# Patient Record
Sex: FEMALE | Race: WHITE | NOT HISPANIC OR LATINO | Employment: OTHER | URBAN - METROPOLITAN AREA
[De-identification: names, ages, dates, MRNs, and addresses within clinical notes are randomized per-mention and may not be internally consistent; named-entity substitution may affect disease eponyms.]

---

## 2020-10-01 PROBLEM — E55.9 VITAMIN D DEFICIENCY: Status: ACTIVE | Noted: 2020-10-01

## 2021-08-06 ENCOUNTER — OFFICE VISIT (OUTPATIENT)
Dept: INTERNAL MEDICINE | Facility: CLINIC | Age: 66
End: 2021-08-06

## 2021-08-06 VITALS — HEIGHT: 63 IN | BODY MASS INDEX: 27.29 KG/M2 | TEMPERATURE: 97.8 F | WEIGHT: 154 LBS

## 2021-08-06 DIAGNOSIS — Z00.00 HEALTHCARE MAINTENANCE: ICD-10-CM

## 2021-08-06 DIAGNOSIS — Z12.31 SCREENING MAMMOGRAM, ENCOUNTER FOR: ICD-10-CM

## 2021-08-06 DIAGNOSIS — L40.9 PSORIASIS: ICD-10-CM

## 2021-08-06 DIAGNOSIS — M89.9 DISORDER OF BONE, UNSPECIFIED: ICD-10-CM

## 2021-08-06 DIAGNOSIS — Z11.59 NEED FOR HEPATITIS C SCREENING TEST: ICD-10-CM

## 2021-08-06 DIAGNOSIS — Z12.11 ENCOUNTER FOR SCREENING COLONOSCOPY: Primary | ICD-10-CM

## 2021-08-06 DIAGNOSIS — Z78.0 POSTMENOPAUSAL: ICD-10-CM

## 2021-08-06 PROCEDURE — G0439 PPPS, SUBSEQ VISIT: HCPCS | Performed by: PHYSICIAN ASSISTANT

## 2021-08-06 RX ORDER — CLOBETASOL PROPIONATE 0.46 MG/ML
SOLUTION TOPICAL DAILY
Qty: 50 ML | Refills: 2 | Status: SHIPPED | OUTPATIENT
Start: 2021-08-06

## 2021-08-06 RX ORDER — TRIAMCINOLONE ACETONIDE 0.25 MG/G
OINTMENT TOPICAL 2 TIMES DAILY
Qty: 80 G | Refills: 1 | Status: SHIPPED | OUTPATIENT
Start: 2021-08-06

## 2021-08-06 RX ORDER — MULTIPLE VITAMINS W/ MINERALS TAB 9MG-400MCG
1 TAB ORAL DAILY
COMMUNITY
End: 2021-12-06

## 2021-08-06 NOTE — PROGRESS NOTES
The ABCs of the Annual Wellness Visit  Subsequent Medicare Wellness Visit    Chief Complaint   Patient presents with   • Establish Care   • Breast Problem     tingling       Subjective   History of Present Illness:  Trinh Go is a 65 y.o. female who presents for a Subsequent Medicare Wellness Visit and to establish care as a new patient. She has not seen a PCP in 5 years. She has never had a screening colonoscopy. Has no constipation, diarrhea or rectal bleeding. She denies unintentional weight loss. She has not had a mammogram since , needs order today as well as DEXA. Her last Pap was in  and was negative, has never had an abnormal.     Tingling of her breast bilaterally for the last few months. Happens a few times a week. No lumps or masses palpated. Feels like when milk wants to let down during pregnancy. 6 months or so. No rashes and no nipple discharge.     HEALTH RISK ASSESSMENT    Recent Hospitalizations:  No hospitalization(s) within the last year.    Current Medical Providers:  Patient Care Team:  Rebecca Corbin PA-C as PCP - General (Physician Assistant)    Smoking Status:  Social History     Tobacco Use   Smoking Status Former Smoker   • Packs/day: 1.00   • Types: Cigarettes   • Quit date:    • Years since quittin.6   Smokeless Tobacco Never Used       Alcohol Consumption:  Social History     Substance and Sexual Activity   Alcohol Use Yes   • Alcohol/week: 7.0 standard drinks   • Types: 7 Glasses of wine per week       Depression Screen:   PHQ-2/PHQ-9 Depression Screening 2021   Little interest or pleasure in doing things 0   Feeling down, depressed, or hopeless 0   Trouble falling or staying asleep, or sleeping too much 0   Feeling tired or having little energy 0   Poor appetite or overeating 0   Feeling bad about yourself - or that you are a failure or have let yourself or your family down 0   Trouble concentrating on things, such as reading the newspaper or watching television  0   Moving or speaking so slowly that other people could have noticed. Or the opposite - being so fidgety or restless that you have been moving around a lot more than usual 0   Thoughts that you would be better off dead, or of hurting yourself in some way 0   Total Score 0       Fall Risk Screen:  VJ Fall Risk Assessment was completed, and patient is at LOW risk for falls.Assessment completed on:8/6/2021    Health Habits and Functional and Cognitive Screening:  Functional & Cognitive Status 8/6/2021   Do you have difficulty preparing food and eating? No   Do you have difficulty bathing yourself, getting dressed or grooming yourself? No   Do you have difficulty using the toilet? No   Do you have difficulty moving around from place to place? No   Do you have trouble with steps or getting out of a bed or a chair? No   Current Diet Well Balanced Diet   Dental Exam Up to date   Eye Exam Up to date   Exercise (times per week) 2 times per week   Current Exercises Include Walking   Do you need help using the phone?  No   Are you deaf or do you have serious difficulty hearing?  No   Do you need help with transportation? No   Do you need help shopping? No   Do you need help preparing meals?  No   Do you need help with housework?  No   Do you need help with laundry? No   Do you need help taking your medications? No   Do you need help managing money? No   Do you ever drive or ride in a car without wearing a seat belt? No   Have you felt unusual stress, anger or loneliness in the last month? No   Who do you live with? Spouse   If you need help, do you have trouble finding someone available to you? No   Have you been bothered in the last four weeks by sexual problems? No   Do you have difficulty concentrating, remembering or making decisions? No         Does the patient have evidence of cognitive impairment? No    Asprin use counseling:Does not need ASA (and currently is not on it)    Age-appropriate Screening  Schedule:  Refer to the list below for future screening recommendations based on patient's age, sex and/or medical conditions. Orders for these recommended tests are listed in the plan section. The patient has been provided with a written plan.    Health Maintenance   Topic Date Due   • TDAP/TD VACCINES (1 - Tdap) Never done   • ZOSTER VACCINE (1 of 2) Never done   • MAMMOGRAM  04/03/2017   • DXA SCAN  04/03/2017   • PAP SMEAR  03/26/2018   • INFLUENZA VACCINE  10/01/2021          The following portions of the patient's history were reviewed and updated as appropriate: allergies, current medications, past family history, past medical history, past social history, past surgical history and problem list.    Outpatient Medications Prior to Visit   Medication Sig Dispense Refill   • multivitamin with minerals tablet tablet Take 1 tablet by mouth Daily.     • clotrimazole-betamethasone (LOTRISONE) 1-0.05 % cream Apply  topically 2 (two) times a day. 1 each 0   • naproxen sodium (ALEVE) 220 MG tablet Take  by mouth.       Facility-Administered Medications Prior to Visit   Medication Dose Route Frequency Provider Last Rate Last Admin   • mupirocin (BACTROBAN) 2 % ointment   Topical Q12H Winnie Yun MD           Patient Active Problem List   Diagnosis   • Arthritis of knee   • Effusion of knee   • Current tear knee, medial meniscus   • Vitamin D deficiency   • Psoriasis       Advanced Care Planning:  ACP discussion was held with the patient during this visit. Patient does not have an advance directive, information provided.    Review of Systems    Compared to one year ago, the patient feels her physical health is the same.  Compared to one year ago, the patient feels her mental health is the same.    Reviewed chart for potential of high risk medication in the elderly: yes  Reviewed chart for potential of harmful drug interactions in the elderly:yes    Objective         Vitals:    08/06/21 0916   Temp: 97.8 °F  "(36.6 °C)   Weight: 69.9 kg (154 lb)   Height: 160 cm (63\")       Body mass index is 27.28 kg/m².  Discussed the patient's BMI with her. The BMI is in the acceptable range.    Physical Exam  Vitals reviewed.   Constitutional:       Appearance: She is well-developed.   HENT:      Head: Normocephalic and atraumatic.      Mouth/Throat:      Mouth: Mucous membranes are moist.      Pharynx: Oropharynx is clear.   Eyes:      Extraocular Movements: Extraocular movements intact.      Conjunctiva/sclera: Conjunctivae normal.      Pupils: Pupils are equal, round, and reactive to light.   Neck:      Thyroid: No thyromegaly.      Vascular: No carotid bruit.   Cardiovascular:      Rate and Rhythm: Normal rate and regular rhythm.      Heart sounds: Normal heart sounds. No murmur heard.     Pulmonary:      Effort: Pulmonary effort is normal.      Breath sounds: Normal breath sounds.   Abdominal:      General: There is no distension.      Palpations: Abdomen is soft. There is no mass.      Tenderness: There is no abdominal tenderness. There is no rebound.   Musculoskeletal:      Cervical back: Neck supple.   Lymphadenopathy:      Cervical: No cervical adenopathy.   Skin:     General: Skin is warm.   Neurological:      Mental Status: She is alert.   Psychiatric:         Behavior: Behavior normal.             Assessment/Plan   Medicare Risks and Personalized Health Plan  CMS Preventative Services Quick Reference  Advance Directive Discussion  Breast Cancer/Mammogram Screening  Colon Cancer Screening  Immunizations Discussed/Encouraged (specific immunizations; Pneumococcal 23 )    The above risks/problems have been discussed with the patient.  Pertinent information has been shared with the patient in the After Visit Summary.  Follow up plans and orders are seen below in the Assessment/Plan Section.    Diagnoses and all orders for this visit:    1. Encounter for screening colonoscopy (Primary)  -     Ambulatory Referral to " Gastroenterology    2. Screening mammogram, encounter for  -     Mammo Screening Digital Tomosynthesis Bilateral With CAD; Future    3. Need for hepatitis C screening test  -     Hepatitis C Antibody    4. Healthcare maintenance  -     Comprehensive Metabolic Panel  -     Lipid Panel With / Chol / HDL Ratio  -     Urinalysis With Culture If Indicated -    5. Postmenopausal  -     Vitamin D 25 Hydroxy  -     DEXA Bone Density Axial    6. Disorder of bone, unspecified   -     Vitamin D 25 Hydroxy    7. Psoriasis  -     clobetasol (TEMOVATE) 0.05 % external solution; Apply  topically to the appropriate area as directed Daily.  Dispense: 50 mL; Refill: 2  -     triamcinolone (KENALOG) 0.025 % ointment; Apply  topically to the appropriate area as directed 2 (Two) Times a Day.  Dispense: 80 g; Refill: 1      Will set up mammogram, no abnormalities on exam. Will order DEXA scan. Fasting labs today. Referral for cscope. Will send in Clobetasol and Triamcinolone for her psoriasis.     Follow Up:  Return in about 1 year (around 8/6/2022) for Medicare Wellness, Lab Today, Lab Appt Before FUP.     An After Visit Summary and PPPS were given to the patient.

## 2021-08-06 NOTE — PROGRESS NOTES
Subjective   Chief Complaint   Patient presents with   • Establish Care   • Breast Problem     tingling       History of Present Illness      Patient Active Problem List   Diagnosis   • Arthritis of knee   • Effusion of knee   • Current tear knee, medial meniscus   • Vitamin D deficiency       No Known Allergies    Current Outpatient Medications on File Prior to Visit   Medication Sig Dispense Refill   • multivitamin with minerals tablet tablet Take 1 tablet by mouth Daily.     • [DISCONTINUED] clotrimazole-betamethasone (LOTRISONE) 1-0.05 % cream Apply  topically 2 (two) times a day. 1 each 0   • [DISCONTINUED] naproxen sodium (ALEVE) 220 MG tablet Take  by mouth.       Current Facility-Administered Medications on File Prior to Visit   Medication Dose Route Frequency Provider Last Rate Last Admin   • [DISCONTINUED] mupirocin (BACTROBAN) 2 % ointment   Topical Q12H Winnie Yun MD           History reviewed. No pertinent past medical history.    Family History   Problem Relation Age of Onset   • Hypertension Mother    • Hyperlipidemia Mother    • Cancer Father    • Diabetes Father    • Stroke Brother        Social History     Socioeconomic History   • Marital status:      Spouse name: Not on file   • Number of children: Not on file   • Years of education: Not on file   • Highest education level: Not on file   Tobacco Use   • Smoking status: Former Smoker     Packs/day: 1.00     Types: Cigarettes     Quit date:      Years since quittin.6   • Smokeless tobacco: Never Used   Vaping Use   • Vaping Use: Never used   Substance and Sexual Activity   • Alcohol use: Yes     Alcohol/week: 7.0 standard drinks     Types: 7 Glasses of wine per week   • Drug use: Never   • Sexual activity: Defer       Past Surgical History:   Procedure Laterality Date   • KNEE SURGERY           The following portions of the patient's history were reviewed and updated as appropriate: problem list, allergies, current  "medications, past medical history, past family history, past social history and past surgical history.    ROS      There is no immunization history on file for this patient.    Objective   Vitals:    08/06/21 0916   Temp: 97.8 °F (36.6 °C)   Weight: 69.9 kg (154 lb)   Height: 160 cm (63\")     Body mass index is 27.28 kg/m².  Physical Exam      Assessment/Plan   There are no diagnoses linked to this encounter.    Return in about 6 months (around 2/6/2022) for Medicare Wellness.           "

## 2021-08-07 LAB
25(OH)D3+25(OH)D2 SERPL-MCNC: 36 NG/ML (ref 30–100)
ALBUMIN SERPL-MCNC: 4.7 G/DL (ref 3.5–5.2)
ALBUMIN/GLOB SERPL: 1.7 G/DL
ALP SERPL-CCNC: 71 U/L (ref 39–117)
ALT SERPL-CCNC: 16 U/L (ref 1–33)
APPEARANCE UR: CLEAR
AST SERPL-CCNC: 21 U/L (ref 1–32)
BACTERIA #/AREA URNS HPF: NORMAL /HPF
BILIRUB SERPL-MCNC: 0.5 MG/DL (ref 0–1.2)
BILIRUB UR QL STRIP: NEGATIVE
BUN SERPL-MCNC: 12 MG/DL (ref 8–23)
BUN/CREAT SERPL: 16.2 (ref 7–25)
CALCIUM SERPL-MCNC: 9.9 MG/DL (ref 8.6–10.5)
CASTS URNS QL MICRO: NORMAL /LPF
CHLORIDE SERPL-SCNC: 104 MMOL/L (ref 98–107)
CHOLEST SERPL-MCNC: 188 MG/DL (ref 0–200)
CHOLEST/HDLC SERPL: 3.92 {RATIO}
CO2 SERPL-SCNC: 26.1 MMOL/L (ref 22–29)
COLOR UR: YELLOW
CREAT SERPL-MCNC: 0.74 MG/DL (ref 0.57–1)
EPI CELLS #/AREA URNS HPF: NORMAL /HPF (ref 0–10)
GLOBULIN SER CALC-MCNC: 2.7 GM/DL
GLUCOSE SERPL-MCNC: 89 MG/DL (ref 65–99)
GLUCOSE UR QL: NEGATIVE
HCV AB S/CO SERPL IA: <0.1 S/CO RATIO (ref 0–0.9)
HDLC SERPL-MCNC: 48 MG/DL (ref 40–60)
HGB UR QL STRIP: NEGATIVE
KETONES UR QL STRIP: NEGATIVE
LDLC SERPL CALC-MCNC: 120 MG/DL (ref 0–100)
LEUKOCYTE ESTERASE UR QL STRIP: NEGATIVE
MICRO URNS: NORMAL
MICRO URNS: NORMAL
NITRITE UR QL STRIP: NEGATIVE
PH UR STRIP: 7 [PH] (ref 5–7.5)
POTASSIUM SERPL-SCNC: 4.5 MMOL/L (ref 3.5–5.2)
PROT SERPL-MCNC: 7.4 G/DL (ref 6–8.5)
PROT UR QL STRIP: NORMAL
RBC #/AREA URNS HPF: NORMAL /HPF (ref 0–2)
SODIUM SERPL-SCNC: 142 MMOL/L (ref 136–145)
SP GR UR: 1.02 (ref 1–1.03)
TRIGL SERPL-MCNC: 109 MG/DL (ref 0–150)
URINALYSIS REFLEX: NORMAL
UROBILINOGEN UR STRIP-MCNC: 0.2 MG/DL (ref 0.2–1)
VLDLC SERPL CALC-MCNC: 20 MG/DL (ref 5–40)
WBC #/AREA URNS HPF: NORMAL /HPF (ref 0–5)

## 2021-08-10 ENCOUNTER — HOSPITAL ENCOUNTER (OUTPATIENT)
Dept: MAMMOGRAPHY | Facility: HOSPITAL | Age: 66
Discharge: HOME | End: 2021-08-10
Admitting: PHYSICIAN ASSISTANT

## 2021-08-10 DIAGNOSIS — Z12.31 SCREENING MAMMOGRAM, ENCOUNTER FOR: ICD-10-CM

## 2021-08-10 PROCEDURE — 77067 SCR MAMMO BI INCL CAD: CPT

## 2021-08-10 PROCEDURE — 77063 BREAST TOMOSYNTHESIS BI: CPT

## 2021-08-25 ENCOUNTER — OFFICE VISIT (OUTPATIENT)
Dept: INTERNAL MEDICINE | Facility: CLINIC | Age: 66
End: 2021-08-25

## 2021-08-25 VITALS — TEMPERATURE: 96.4 F | BODY MASS INDEX: 27.29 KG/M2 | HEIGHT: 63 IN | WEIGHT: 154 LBS

## 2021-08-25 DIAGNOSIS — Z01.419 ENCOUNTER FOR CERVICAL PAP SMEAR WITH PELVIC EXAM: Primary | ICD-10-CM

## 2021-08-25 PROCEDURE — G0101 CA SCREEN;PELVIC/BREAST EXAM: HCPCS | Performed by: PHYSICIAN ASSISTANT

## 2021-08-25 NOTE — PROGRESS NOTES
Subjective   Chief Complaint   Patient presents with   • Gynecologic Exam       History of Present Illness     Pt is here today for pap/pelvic. No pelvic pain or vaginal bleeding. Has been several years since her last exam.      Patient Active Problem List   Diagnosis   • Arthritis of knee   • Effusion of knee   • Current tear knee, medial meniscus   • Vitamin D deficiency   • Psoriasis       No Known Allergies    Current Outpatient Medications on File Prior to Visit   Medication Sig Dispense Refill   • clobetasol (TEMOVATE) 0.05 % external solution Apply  topically to the appropriate area as directed Daily. 50 mL 2   • multivitamin with minerals tablet tablet Take 1 tablet by mouth Daily.     • triamcinolone (KENALOG) 0.025 % ointment Apply  topically to the appropriate area as directed 2 (Two) Times a Day. 80 g 1     No current facility-administered medications on file prior to visit.       No past medical history on file.    Family History   Problem Relation Age of Onset   • Hypertension Mother    • Hyperlipidemia Mother    • Cancer Father         leukemia   • Diabetes Father    • Stroke Brother    • Colon cancer Maternal Grandmother    • Breast cancer Paternal Grandmother         70s       Social History     Socioeconomic History   • Marital status:      Spouse name: Not on file   • Number of children: Not on file   • Years of education: Not on file   • Highest education level: Not on file   Tobacco Use   • Smoking status: Former Smoker     Packs/day: 1.00     Types: Cigarettes     Quit date:      Years since quittin.6   • Smokeless tobacco: Never Used   Vaping Use   • Vaping Use: Never used   Substance and Sexual Activity   • Alcohol use: Yes     Alcohol/week: 7.0 standard drinks     Types: 7 Glasses of wine per week   • Drug use: Never   • Sexual activity: Defer       Past Surgical History:   Procedure Laterality Date   • KNEE SURGERY       The following portions of the patient's history were  "reviewed and updated as appropriate: problem list, allergies, current medications, past medical history, past family history, past social history and past surgical history.    Review of Systems   Genitourinary: Negative for pelvic pain.        No vaginal bleeding       Immunization History   Administered Date(s) Administered   • COVID-19 (PFIZER) 03/08/2021, 03/29/2021   • Flu Vaccine Quad PF >36MO 12/23/2019       Objective   Vitals:    08/25/21 1123   Temp: 96.4 °F (35.8 °C)   Weight: 69.9 kg (154 lb)   Height: 160 cm (63\")     Body mass index is 27.28 kg/m².  Physical Exam  Exam conducted with a chaperone present.   Constitutional:       Appearance: Normal appearance.   Genitourinary:     Labia:         Right: No lesion or injury.         Left: No lesion.       Vagina: Normal.      Cervix: Normal.      Uterus: Normal.       Adnexa: Right adnexa normal and left adnexa normal.   Neurological:      Mental Status: She is alert.       Assessment/Plan   Diagnoses and all orders for this visit:    1. Encounter for cervical Pap smear with pelvic exam (Primary)    Normal exam today, send out pap. FUP in 1 yr for CPE.            "

## 2021-08-27 LAB
CONV .: NORMAL
CYTOLOGIST CVX/VAG CYTO: NORMAL
CYTOLOGY CVX/VAG DOC CYTO: NORMAL
CYTOLOGY CVX/VAG DOC THIN PREP: NORMAL
DX ICD CODE: NORMAL
HIV 1 & 2 AB SER-IMP: NORMAL
OTHER STN SPEC: NORMAL
STAT OF ADQ CVX/VAG CYTO-IMP: NORMAL

## 2021-09-01 ENCOUNTER — PREP FOR SURGERY (OUTPATIENT)
Dept: SURGERY | Facility: SURGERY CENTER | Age: 66
End: 2021-09-01

## 2021-09-01 DIAGNOSIS — Z12.11 ENCOUNTER FOR SCREENING FOR MALIGNANT NEOPLASM OF COLON: Primary | ICD-10-CM

## 2021-09-01 DIAGNOSIS — Z80.0 FAMILY HISTORY OF COLON CANCER: ICD-10-CM

## 2021-09-01 RX ORDER — SODIUM CHLORIDE, SODIUM LACTATE, POTASSIUM CHLORIDE, CALCIUM CHLORIDE 600; 310; 30; 20 MG/100ML; MG/100ML; MG/100ML; MG/100ML
30 INJECTION, SOLUTION INTRAVENOUS CONTINUOUS PRN
Status: CANCELLED | OUTPATIENT
Start: 2021-09-01

## 2021-09-01 RX ORDER — SODIUM CHLORIDE 0.9 % (FLUSH) 0.9 %
10 SYRINGE (ML) INJECTION AS NEEDED
Status: CANCELLED | OUTPATIENT
Start: 2021-09-01

## 2021-09-01 RX ORDER — SODIUM CHLORIDE 0.9 % (FLUSH) 0.9 %
3 SYRINGE (ML) INJECTION EVERY 12 HOURS SCHEDULED
Status: CANCELLED | OUTPATIENT
Start: 2021-09-01

## 2021-11-10 ENCOUNTER — HOSPITAL ENCOUNTER (OUTPATIENT)
Dept: BONE DENSITY | Facility: HOSPITAL | Age: 66
Discharge: HOME | End: 2021-11-10
Admitting: PHYSICIAN ASSISTANT

## 2021-11-10 PROCEDURE — 77080 DXA BONE DENSITY AXIAL: CPT

## 2021-11-11 PROBLEM — M85.80 OSTEOPENIA: Status: ACTIVE | Noted: 2021-11-11

## 2021-12-06 RX ORDER — CALCIPOTRIENE AND BETAMETHASONE DIPROPIONATE 50; .5 UG/G; MG/G
1 AEROSOL, FOAM TOPICAL DAILY PRN
COMMUNITY

## 2021-12-06 NOTE — SIGNIFICANT NOTE
Education provided the Patient on the following:    - Nothing to Eat or Drink after MN the night before the procedure    - Avoid red/purple fluids while completing their bowel prep as ordered by physician  -Contact Gastrointerologist office for any questions about specific details regarding colon prep    -You will need to have someone drive you home after your colonoscopy and remain with you for 24 hours after the procedure  - The date of your Surgery, you may have one visitor at bedside or within 10-15 minutes of [facility] [place]  -Please wear warm socks when you arrive for your colonoscopy  -Remove all jewelry and leave any valuables before arriving the day of your procedure (all will have to be removed before leaving preop)  -You will need to arrive at 8:30 on 12/09 for your colonoscopy    -Feel free to contact us at: [phone] with any additional questions/concerns

## 2021-12-09 ENCOUNTER — ANESTHESIA EVENT (OUTPATIENT)
Dept: SURGERY | Facility: SURGERY CENTER | Age: 66
End: 2021-12-09

## 2021-12-09 ENCOUNTER — HOSPITAL ENCOUNTER (OUTPATIENT)
Facility: SURGERY CENTER | Age: 66
Setting detail: HOSPITAL OUTPATIENT SURGERY
Discharge: HOME | End: 2021-12-09
Attending: INTERNAL MEDICINE | Admitting: INTERNAL MEDICINE

## 2021-12-09 ENCOUNTER — ANESTHESIA (OUTPATIENT)
Dept: SURGERY | Facility: SURGERY CENTER | Age: 66
End: 2021-12-09

## 2021-12-09 VITALS
DIASTOLIC BLOOD PRESSURE: 73 MMHG | RESPIRATION RATE: 16 BRPM | HEIGHT: 63 IN | WEIGHT: 154 LBS | SYSTOLIC BLOOD PRESSURE: 109 MMHG | TEMPERATURE: 97.7 F | BODY MASS INDEX: 27.29 KG/M2 | HEART RATE: 79 BPM | OXYGEN SATURATION: 97 %

## 2021-12-09 DIAGNOSIS — Z12.11 ENCOUNTER FOR SCREENING FOR MALIGNANT NEOPLASM OF COLON: ICD-10-CM

## 2021-12-09 DIAGNOSIS — Z80.0 FAMILY HISTORY OF COLON CANCER: ICD-10-CM

## 2021-12-09 PROCEDURE — 0DBK8ZZ EXCISION OF ASCENDING COLON, VIA NATURAL OR ARTIFICIAL OPENING ENDOSCOPIC: ICD-10-PCS | Performed by: INTERNAL MEDICINE

## 2021-12-09 PROCEDURE — 25010000002 PROPOFOL 10 MG/ML EMULSION: Performed by: ANESTHESIOLOGY

## 2021-12-09 PROCEDURE — 45385 COLONOSCOPY W/LESION REMOVAL: CPT | Performed by: INTERNAL MEDICINE

## 2021-12-09 PROCEDURE — 88305 TISSUE EXAM BY PATHOLOGIST: CPT | Performed by: INTERNAL MEDICINE

## 2021-12-09 RX ORDER — ONDANSETRON 2 MG/ML
4 INJECTION INTRAMUSCULAR; INTRAVENOUS ONCE AS NEEDED
Status: DISCONTINUED | OUTPATIENT
Start: 2021-12-09 | End: 2021-12-09 | Stop reason: HOSPADM

## 2021-12-09 RX ORDER — SODIUM CHLORIDE, SODIUM LACTATE, POTASSIUM CHLORIDE, CALCIUM CHLORIDE 600; 310; 30; 20 MG/100ML; MG/100ML; MG/100ML; MG/100ML
30 INJECTION, SOLUTION INTRAVENOUS CONTINUOUS PRN
Status: DISCONTINUED | OUTPATIENT
Start: 2021-12-09 | End: 2021-12-09 | Stop reason: HOSPADM

## 2021-12-09 RX ORDER — MAGNESIUM HYDROXIDE 1200 MG/15ML
LIQUID ORAL AS NEEDED
Status: DISCONTINUED | OUTPATIENT
Start: 2021-12-09 | End: 2021-12-09 | Stop reason: HOSPADM

## 2021-12-09 RX ORDER — PROPOFOL 10 MG/ML
VIAL (ML) INTRAVENOUS AS NEEDED
Status: DISCONTINUED | OUTPATIENT
Start: 2021-12-09 | End: 2021-12-09 | Stop reason: SURG

## 2021-12-09 RX ORDER — PROPOFOL 10 MG/ML
VIAL (ML) INTRAVENOUS CONTINUOUS PRN
Status: DISCONTINUED | OUTPATIENT
Start: 2021-12-09 | End: 2021-12-09 | Stop reason: SURG

## 2021-12-09 RX ORDER — SODIUM CHLORIDE 0.9 % (FLUSH) 0.9 %
3 SYRINGE (ML) INJECTION EVERY 12 HOURS SCHEDULED
Status: DISCONTINUED | OUTPATIENT
Start: 2021-12-09 | End: 2021-12-09 | Stop reason: HOSPADM

## 2021-12-09 RX ORDER — SODIUM CHLORIDE 0.9 % (FLUSH) 0.9 %
10 SYRINGE (ML) INJECTION AS NEEDED
Status: DISCONTINUED | OUTPATIENT
Start: 2021-12-09 | End: 2021-12-09 | Stop reason: HOSPADM

## 2021-12-09 RX ORDER — LIDOCAINE HYDROCHLORIDE 20 MG/ML
INJECTION, SOLUTION INFILTRATION; PERINEURAL AS NEEDED
Status: DISCONTINUED | OUTPATIENT
Start: 2021-12-09 | End: 2021-12-09 | Stop reason: SURG

## 2021-12-09 RX ORDER — EPHEDRINE SULFATE 50 MG/ML
INJECTION INTRAVENOUS AS NEEDED
Status: DISCONTINUED | OUTPATIENT
Start: 2021-12-09 | End: 2021-12-09 | Stop reason: SURG

## 2021-12-09 RX ADMIN — SODIUM CHLORIDE, POTASSIUM CHLORIDE, SODIUM LACTATE AND CALCIUM CHLORIDE 30 ML/HR: 600; 310; 30; 20 INJECTION, SOLUTION INTRAVENOUS at 09:04

## 2021-12-09 RX ADMIN — Medication 180 MCG/KG/MIN: at 09:59

## 2021-12-09 RX ADMIN — PROPOFOL 100 MG: 10 INJECTION, EMULSION INTRAVENOUS at 09:59

## 2021-12-09 RX ADMIN — LIDOCAINE HYDROCHLORIDE 60 MG: 20 INJECTION, SOLUTION INFILTRATION; PERINEURAL at 09:59

## 2021-12-09 RX ADMIN — EPHEDRINE SULFATE 10 MG: 50 INJECTION, SOLUTION INTRAVENOUS at 10:17

## 2021-12-09 NOTE — H&P
No chief complaint on file.      HPI  Patient today for screening colonoscopy.         Problem List:    Patient Active Problem List   Diagnosis   • Arthritis of knee   • Effusion of knee   • Current tear knee, medial meniscus   • Vitamin D deficiency   • Psoriasis   • Encounter for screening for malignant neoplasm of colon   • Family history of colon cancer   • Osteopenia       Medical History:  History reviewed. No pertinent past medical history.     Social History:    Social History     Socioeconomic History   • Marital status:    Tobacco Use   • Smoking status: Former Smoker     Packs/day: 1.00     Types: Cigarettes     Quit date:      Years since quittin.9   • Smokeless tobacco: Never Used   Vaping Use   • Vaping Use: Never used   Substance and Sexual Activity   • Alcohol use: Yes     Alcohol/week: 7.0 standard drinks     Types: 7 Glasses of wine per week   • Drug use: Never   • Sexual activity: Defer       Family History:   Family History   Problem Relation Age of Onset   • Hypertension Mother    • Hyperlipidemia Mother    • Cancer Father         leukemia   • Diabetes Father    • Stroke Brother    • Colon cancer Maternal Grandmother    • Breast cancer Paternal Grandmother         70s       Surgical History:   Past Surgical History:   Procedure Laterality Date   • FRACTURE SURGERY     • FRACTURE SURGERY Right     arm   • KNEE SURGERY     • SHOULDER MANIPULATION Right          Current Facility-Administered Medications:   •  lactated ringers infusion, 30 mL/hr, Intravenous, Continuous PRN, Xavi Briceño MD  •  sodium chloride 0.9 % flush 10 mL, 10 mL, Intravenous, PRN, Xavi Briceño MD  •  sodium chloride 0.9 % flush 3 mL, 3 mL, Intravenous, Q12H, Xavi Briceño MD    Allergies: No Known Allergies     The following portions of the patient's history were reviewed by me and updated as appropriate: review of systems, allergies, current medications, past family history, past medical  history, past social history, past surgical history and problem list.    There were no vitals filed for this visit.    PHYSICAL EXAM:    CONSTITUTIONAL:  today's vital signs reviewed by me  GASTROINTESTINAL: abdomen is soft nontender nondistended with normal active bowel sounds, no masses are appreciated    Assessment/ Plan  We will proceed today with screening colonoscopy.    Risks and benefits as well as alternatives to endoscopic evaluation were explained to the patient and they voiced understanding and wish to proceed.  These risks include but are not limited to the risk of bleeding, perforation, adverse reaction to sedation, and missed lesions.  The patient was given the opportunity to ask questions prior to the endoscopic procedure.

## 2021-12-09 NOTE — ANESTHESIA PREPROCEDURE EVALUATION
Anesthesia Evaluation     Patient summary reviewed and Nursing notes reviewed   NPO Solid Status: > 8 hours  NPO Liquid Status: > 2 hours           Airway   Mallampati: II  Dental - normal exam   (+) partials    Pulmonary - negative pulmonary ROS and normal exam   Cardiovascular - negative cardio ROS and normal exam        Neuro/Psych- negative ROS  GI/Hepatic/Renal/Endo - negative ROS     Musculoskeletal     Abdominal    Substance History - negative use     OB/GYN          Other   arthritis,                    Anesthesia Plan    ASA 2     MAC       Anesthetic plan, all risks, benefits, and alternatives have been provided, discussed and informed consent has been obtained with: patient.

## 2021-12-09 NOTE — ANESTHESIA POSTPROCEDURE EVALUATION
"Patient: Trinh Go    Procedure Summary     Date: 12/09/21 Room / Location: [facility] / [facility]    Anesthesia Start: 0957 Anesthesia Stop: 1026    Procedure: COLONOSCOPY (N/A ) Diagnosis:       Encounter for screening for malignant neoplasm of colon      Family history of colon cancer      (Encounter for screening for malignant neoplasm of colon [Z12.11])      (Family history of colon cancer [Z80.0])    Surgeons: Xavi Briceño MD Provider: Dionna Honeycutt MD    Anesthesia Type: MAC ASA Status: 2          Anesthesia Type: MAC    Vitals  Vitals Value Taken Time   /73 12/09/21 1053   Temp 36.5 °C (97.7 °F) 12/09/21 1031   Pulse 80 12/09/21 1038   Resp 17 12/09/21 1038   SpO2 97 % 12/09/21 1039   Vitals shown include unvalidated device data.        Post Anesthesia Care and Evaluation    Patient location during evaluation: PHASE II  Patient participation: complete - patient participated  Level of consciousness: awake  Pain management: adequate  Airway patency: patent  Anesthetic complications: No anesthetic complications    Cardiovascular status: acceptable  Respiratory status: acceptable  Hydration status: acceptable    Comments: BP 98/57   Pulse 80   Temp 36.5 °C (97.7 °F) (Temporal)   Resp 17   Ht 160 cm (63\")   Wt 69.9 kg (154 lb)   SpO2 96%   BMI 27.28 kg/m²       "

## 2021-12-10 LAB
LAB AP CASE REPORT: NORMAL
PATH REPORT.FINAL DX SPEC: NORMAL
PATH REPORT.GROSS SPEC: NORMAL

## 2022-01-31 ENCOUNTER — TELEPHONE (OUTPATIENT)
Dept: INTERNAL MEDICINE | Facility: CLINIC | Age: 67
End: 2022-01-31

## 2022-01-31 ENCOUNTER — OFFICE VISIT (OUTPATIENT)
Dept: INTERNAL MEDICINE | Facility: CLINIC | Age: 67
End: 2022-01-31

## 2022-01-31 VITALS — BODY MASS INDEX: 27.11 KG/M2 | TEMPERATURE: 97.8 F | HEIGHT: 63 IN | WEIGHT: 153 LBS

## 2022-01-31 DIAGNOSIS — R21 RASH: Primary | ICD-10-CM

## 2022-01-31 PROCEDURE — 99213 OFFICE O/P EST LOW 20 MIN: CPT | Performed by: INTERNAL MEDICINE

## 2022-01-31 RX ORDER — ASCORBIC ACID 100 MG
TABLET,CHEWABLE ORAL
COMMUNITY

## 2022-01-31 NOTE — TELEPHONE ENCOUNTER
Caller: Trinh Go    Relationship to patient: Self    Best call back number: [phone]    Chief complaint: DRY/FLAKY RASH ALL OVER FACE AND CHEST    Type of visit: SAME DAY OR TELEHEALTH    Requested date: 1/31/22    If rescheduling, when is the original appointment:    Additional notes:

## 2022-01-31 NOTE — TELEPHONE ENCOUNTER
Patient has rash on face, she could not make appointment today (short notice), please advise [phone].

## 2022-01-31 NOTE — PROGRESS NOTES
Subjective        Chief Complaint   Patient presents with   • Rash           Trinh Go is a 66 y.o. female who presents for    Patient Active Problem List   Diagnosis   • Arthritis of knee   • Effusion of knee   • Current tear knee, medial meniscus   • Vitamin D deficiency   • Psoriasis   • Encounter for screening for malignant neoplasm of colon   • Family history of colon cancer   • Osteopenia       History of Present Illness     She developed a rash on her face, arms and chest. It started about a month ago. It itches. She has not changed any soaps or detergents. Triamcinolone helped the itch but not the rash. She got her COVID booster at the end of November.  No Known Allergies    Current Outpatient Medications on File Prior to Visit   Medication Sig Dispense Refill   • Ascorbic Acid (Vitamin C) 100 MG chewable tablet Chew.     • Calcipotriene-Betameth Diprop (Enstilar) 0.005-0.064 % foam Apply 1 Pump topically Daily As Needed.     • Calcium Carbonate (CALCIUM 500 PO) Take 500 mg by mouth Daily.     • Cats Claw, Uncaria tomentosa, (CATS CLAW PO) Take  by mouth.     • clobetasol (TEMOVATE) 0.05 % external solution Apply  topically to the appropriate area as directed Daily. 50 mL 2   • Probiotic Product (PROBIOTIC-10 PO) Take  by mouth.     • triamcinolone (KENALOG) 0.025 % ointment Apply  topically to the appropriate area as directed 2 (Two) Times a Day. 80 g 1   • Unable to find 1 each 1 (One) Time. pure omega liq     • vitamin D3 125 MCG (5000 UT) capsule capsule Take 5,000 Units by mouth Daily.       No current facility-administered medications on file prior to visit.       History reviewed. No pertinent past medical history.    Past Surgical History:   Procedure Laterality Date   • COLONOSCOPY N/A 12/9/2021    Procedure: COLONOSCOPY;  Surgeon: Xavi Briceño MD;  Location: [facility] [place] MAIN OR;  Service: Gastroenterology;  Laterality: N/A;   • FRACTURE SURGERY     • FRACTURE SURGERY Right     arm   • KNEE  "SURGERY     • SHOULDER MANIPULATION Right        Family History   Problem Relation Age of Onset   • Hypertension Mother    • Hyperlipidemia Mother    • Cancer Father         leukemia   • Diabetes Father    • Stroke Brother    • Colon cancer Maternal Grandmother    • Breast cancer Paternal Grandmother         70s       Social History     Socioeconomic History   • Marital status:    Tobacco Use   • Smoking status: Former Smoker     Packs/day: 1.00     Types: Cigarettes     Quit date:      Years since quittin.1   • Smokeless tobacco: Never Used   Vaping Use   • Vaping Use: Never used   Substance and Sexual Activity   • Alcohol use: Yes     Alcohol/week: 7.0 standard drinks     Types: 7 Glasses of wine per week   • Drug use: Never   • Sexual activity: Defer           The following portions of the patient's history were reviewed and updated as appropriate: problem list, allergies, current medications, past medical history, past family history, past social history and past surgical history.    Review of Systems    Immunization History   Administered Date(s) Administered   • COVID-19 (PFIZER) PURPLE CAP 2021, 2021, 2021   • Flu Vaccine Quad PF >36MO 2019, 2021   • TD Preservative Free 2021       Objective   Vitals:    22 1608   Temp: 97.8 °F (36.6 °C)   Weight: 69.4 kg (153 lb)   Height: 160 cm (62.99\")     Body mass index is 27.11 kg/m².  Physical Exam  Vitals reviewed.   Skin:     Comments: Symmetrical red patches on forearms. Red patch on upper chest and posterior neck as well as on cheeks and ears.   Neurological:      Mental Status: She is oriented to person, place, and time.   Psychiatric:         Mood and Affect: Mood normal.         Procedures    Assessment/Plan   Diagnoses and all orders for this visit:    1. Rash (Primary)  -     Ambulatory Referral to Dermatology        Get into derm this week.     She will call back with date of Pneumovax.     No " follow-ups on file.

## 2022-08-05 DIAGNOSIS — Z00.00 HEALTHCARE MAINTENANCE: ICD-10-CM

## 2022-08-05 DIAGNOSIS — E55.9 VITAMIN D DEFICIENCY: Primary | ICD-10-CM

## 2022-08-07 LAB
25(OH)D3+25(OH)D2 SERPL-MCNC: 67 NG/ML (ref 30–100)
ALBUMIN SERPL-MCNC: 4 G/DL (ref 3.8–4.8)
ALBUMIN/GLOB SERPL: 1.6 {RATIO} (ref 1.2–2.2)
ALP SERPL-CCNC: 62 IU/L (ref 44–121)
ALT SERPL-CCNC: 22 IU/L (ref 0–32)
APPEARANCE UR: CLEAR
AST SERPL-CCNC: 25 IU/L (ref 0–40)
BACTERIA #/AREA URNS HPF: NORMAL /[HPF]
BACTERIA UR CULT: ABNORMAL
BACTERIA UR CULT: ABNORMAL
BILIRUB SERPL-MCNC: 0.3 MG/DL (ref 0–1.2)
BILIRUB UR QL STRIP: NEGATIVE
BUN SERPL-MCNC: 12 MG/DL (ref 8–27)
BUN/CREAT SERPL: 16 (ref 12–28)
CALCIUM SERPL-MCNC: 9.2 MG/DL (ref 8.7–10.3)
CASTS URNS QL MICRO: NORMAL /LPF
CHLORIDE SERPL-SCNC: 105 MMOL/L (ref 96–106)
CHOLEST SERPL-MCNC: 128 MG/DL (ref 100–199)
CHOLEST/HDLC SERPL: 3 RATIO (ref 0–4.4)
CO2 SERPL-SCNC: 24 MMOL/L (ref 20–29)
COLOR UR: YELLOW
CREAT SERPL-MCNC: 0.77 MG/DL (ref 0.57–1)
EGFRCR SERPLBLD CKD-EPI 2021: 84 ML/MIN/1.73
EPI CELLS #/AREA URNS HPF: NORMAL /HPF (ref 0–10)
GLOBULIN SER CALC-MCNC: 2.5 G/DL (ref 1.5–4.5)
GLUCOSE SERPL-MCNC: 79 MG/DL (ref 65–99)
GLUCOSE UR QL STRIP: NEGATIVE
HDLC SERPL-MCNC: 42 MG/DL
HGB UR QL STRIP: NEGATIVE
KETONES UR QL STRIP: NEGATIVE
LDLC SERPL CALC-MCNC: 68 MG/DL (ref 0–99)
LEUKOCYTE ESTERASE UR QL STRIP: ABNORMAL
MICRO URNS: ABNORMAL
NITRITE UR QL STRIP: NEGATIVE
PH UR STRIP: 6.5 [PH] (ref 5–7.5)
POTASSIUM SERPL-SCNC: 4.4 MMOL/L (ref 3.5–5.2)
PROT SERPL-MCNC: 6.5 G/DL (ref 6–8.5)
PROT UR QL STRIP: NEGATIVE
RBC #/AREA URNS HPF: NORMAL /HPF (ref 0–2)
SODIUM SERPL-SCNC: 143 MMOL/L (ref 134–144)
SP GR UR STRIP: 1.02 (ref 1–1.03)
TRIGL SERPL-MCNC: 97 MG/DL (ref 0–149)
URINALYSIS REFLEX: ABNORMAL
UROBILINOGEN UR STRIP-MCNC: 0.2 MG/DL (ref 0.2–1)
VLDLC SERPL CALC-MCNC: 18 MG/DL (ref 5–40)
WBC #/AREA URNS HPF: NORMAL /HPF (ref 0–5)

## 2022-08-11 ENCOUNTER — TELEPHONE (OUTPATIENT)
Dept: INTERNAL MEDICINE | Facility: CLINIC | Age: 67
End: 2022-08-11

## 2022-08-11 NOTE — TELEPHONE ENCOUNTER
Spoke with pt, informed her that terry will discuss labs tomorrow. She was just making sure lipid was ok because she was not fasting.

## 2022-08-11 NOTE — TELEPHONE ENCOUNTER
Caller: Donavan Trinh L    Relationship: Self    Best call back number: [phone]    Caller requesting test results: PATIENT    What test was performed: LABS    When was the test performed: 08/05    Where was the test performed: IN OFFICE    Additional notes: PATIENT ASKING IF LABS NEED TO BE REDRAWN OR IF THEY WERE OKAY FROM THE OTHER DAY.    ALSO PATIENT IF SHE NEEDS TO COME IN TOMORROW OR WAIT BECAUSE NEW LABS ARE NEEDED.    PATIENT STATED SHE WAS WAITING ON A CALL AND HAS NOT HEARD ANYTHING AND WOULD LIKE TO KNOW BEFORE END OF DAY TODAY SO SHE DOES NOT COME IN TOMORROW IF NOT NEEDED.

## 2022-08-12 ENCOUNTER — OFFICE VISIT (OUTPATIENT)
Dept: INTERNAL MEDICINE | Facility: CLINIC | Age: 67
End: 2022-08-12

## 2022-08-12 VITALS
TEMPERATURE: 98 F | BODY MASS INDEX: 23.74 KG/M2 | HEIGHT: 63 IN | WEIGHT: 134 LBS | SYSTOLIC BLOOD PRESSURE: 110 MMHG | DIASTOLIC BLOOD PRESSURE: 70 MMHG

## 2022-08-12 DIAGNOSIS — Z12.31 SCREENING MAMMOGRAM, ENCOUNTER FOR: Primary | ICD-10-CM

## 2022-08-12 DIAGNOSIS — Z23 NEED FOR PNEUMOCOCCAL VACCINE: ICD-10-CM

## 2022-08-12 PROCEDURE — G0439 PPPS, SUBSEQ VISIT: HCPCS | Performed by: PHYSICIAN ASSISTANT

## 2022-08-12 PROCEDURE — G0009 ADMIN PNEUMOCOCCAL VACCINE: HCPCS | Performed by: PHYSICIAN ASSISTANT

## 2022-08-12 PROCEDURE — 1160F RVW MEDS BY RX/DR IN RCRD: CPT | Performed by: PHYSICIAN ASSISTANT

## 2022-08-12 PROCEDURE — 90677 PCV20 VACCINE IM: CPT | Performed by: PHYSICIAN ASSISTANT

## 2022-08-12 PROCEDURE — 1170F FXNL STATUS ASSESSED: CPT | Performed by: PHYSICIAN ASSISTANT

## 2022-08-12 NOTE — PROGRESS NOTES
The ABCs of the Annual Wellness Visit  Subsequent Medicare Wellness Visit    Chief Complaint   Patient presents with   • Medicare Wellness-subsequent     No complaints      Subjective    History of Present Illness:  Trinh Go is a 66 y.o. female who presents for a Subsequent Medicare Wellness Visit. She has been doing optivia diet and has lost 20 lbs. She has been playing pickleball regularly. No cp or soa.     The following portions of the patient's history were reviewed and   updated as appropriate: allergies, current medications, past family history, past medical history, past social history, past surgical history and problem list.    Compared to one year ago, the patient feels her physical   health is better.    Compared to one year ago, the patient feels her mental   health is the same.    Recent Hospitalizations:  She was not admitted to the hospital during the last year.       Current Medical Providers:  Patient Care Team:  Rebecca Corbin PA-C as PCP - General (Physician Assistant)    Outpatient Medications Prior to Visit   Medication Sig Dispense Refill   • Ascorbic Acid (Vitamin C) 100 MG chewable tablet Chew.     • Calcipotriene-Betameth Diprop (Enstilar) 0.005-0.064 % foam Apply 1 Pump topically Daily As Needed.     • Calcium Carbonate (CALCIUM 500 PO) Take 500 mg by mouth Daily.     • Cats Claw, Uncaria tomentosa, (CATS CLAW PO) Take  by mouth.     • clobetasol (TEMOVATE) 0.05 % external solution Apply  topically to the appropriate area as directed Daily. 50 mL 2   • Probiotic Product (PROBIOTIC-10 PO) Take  by mouth.     • triamcinolone (KENALOG) 0.025 % ointment Apply  topically to the appropriate area as directed 2 (Two) Times a Day. 80 g 1   • Unable to find 1 each 1 (One) Time. pure omega liq     • vitamin D3 125 MCG (5000 UT) capsule capsule Take 5,000 Units by mouth Daily.       No facility-administered medications prior to visit.       No opioid medication identified on active medication  "list. I have reviewed chart for other potential  high risk medication/s and harmful drug interactions in the elderly.          Aspirin is not on active medication list.  Aspirin use is not indicated based on review of current medical condition/s. Risk of harm outweighs potential benefits.  .    Patient Active Problem List   Diagnosis   • Arthritis of knee   • Effusion of knee   • Current tear knee, medial meniscus   • Vitamin D deficiency   • Psoriasis   • Encounter for screening for malignant neoplasm of colon   • Family history of colon cancer   • Osteopenia     Advance Care Planning  Advance Directive is not on file.  ACP discussion was held with the patient during this visit. Patient has an advance directive (not in EMR), copy requested.    Review of Systems   Cardiovascular: Negative for chest pain and palpitations.   Gastrointestinal: Negative for abdominal distention and abdominal pain.        Objective    Vitals:    08/12/22 1016 08/12/22 1049   BP:  110/70   Temp: 98 °F (36.7 °C)    Weight: 60.8 kg (134 lb)    Height: 160 cm (62.99\")      Estimated body mass index is 23.74 kg/m² as calculated from the following:    Height as of this encounter: 160 cm (62.99\").    Weight as of this encounter: 60.8 kg (134 lb).    BMI is within normal parameters. No other follow-up for BMI required.      Does the patient have evidence of cognitive impairment? No    Physical Exam  Vitals reviewed.   Constitutional:       Appearance: Normal appearance.   HENT:      Head: Normocephalic and atraumatic.   Eyes:      Extraocular Movements: Extraocular movements intact.      Conjunctiva/sclera: Conjunctivae normal.      Pupils: Pupils are equal, round, and reactive to light.   Neck:      Vascular: No carotid bruit.   Cardiovascular:      Rate and Rhythm: Normal rate and regular rhythm.      Heart sounds: Normal heart sounds.   Pulmonary:      Effort: Pulmonary effort is normal.      Breath sounds: Normal breath sounds.   Abdominal: "      General: Abdomen is flat. Bowel sounds are normal.      Palpations: Abdomen is soft.   Neurological:      Mental Status: She is alert.   Psychiatric:         Mood and Affect: Mood normal.         Behavior: Behavior normal.         Thought Content: Thought content normal.         Judgment: Judgment normal.       Lab Results   Component Value Date    CHLPL 128 2022    TRIG 97 2022    HDL 42 2022    LDL 68 2022    VLDL 18 2022        HEALTH RISK ASSESSMENT    Smoking Status:  Social History     Tobacco Use   Smoking Status Former Smoker   • Packs/day: 1.00   • Types: Cigarettes   • Quit date:    • Years since quittin.6   Smokeless Tobacco Never Used     Alcohol Consumption:  Social History     Substance and Sexual Activity   Alcohol Use Yes   • Alcohol/week: 7.0 standard drinks   • Types: 7 Glasses of wine per week     Fall Risk Screen:    VJ Fall Risk Assessment was completed, and patient is at MODERATE risk for falls. Assessment completed on:2022    Depression Screening:  PHQ-2/PHQ-9 Depression Screening 2022   Retired PHQ-9 Total Score -   Retired Total Score -   Little Interest or Pleasure in Doing Things 0-->not at all   Feeling Down, Depressed or Hopeless 0-->not at all   PHQ-9: Brief Depression Severity Measure Score 0       Health Habits and Functional and Cognitive Screening:  Functional & Cognitive Status 2022   Do you have difficulty preparing food and eating? No   Do you have difficulty bathing yourself, getting dressed or grooming yourself? No   Do you have difficulty using the toilet? No   Do you have difficulty moving around from place to place? No   Do you have trouble with steps or getting out of a bed or a chair? No   Current Diet Well Balanced Diet   Dental Exam Up to date   Eye Exam Up to date   Exercise (times per week) 5 times per week   Current Exercises Include Walking   Do you need help using the phone?  No   Are you deaf or do you  have serious difficulty hearing?  No   Do you need help with transportation? No   Do you need help shopping? No   Do you need help preparing meals?  No   Do you need help with housework?  No   Do you need help with laundry? No   Do you need help taking your medications? No   Do you need help managing money? No   Do you ever drive or ride in a car without wearing a seat belt? No   Have you felt unusual stress, anger or loneliness in the last month? -   Who do you live with? -   If you need help, do you have trouble finding someone available to you? -   Have you been bothered in the last four weeks by sexual problems? -   Do you have difficulty concentrating, remembering or making decisions? -       Age-appropriate Screening Schedule:  Refer to the list below for future screening recommendations based on patient's age, sex and/or medical conditions. Orders for these recommended tests are listed in the plan section. The patient has been provided with a written plan.    Health Maintenance   Topic Date Due   • ZOSTER VACCINE (1 of 2) Never done   • INFLUENZA VACCINE  10/01/2022   • MAMMOGRAM  08/10/2023   • DXA SCAN  11/10/2023   • PAP SMEAR  08/25/2024   • TDAP/TD VACCINES (2 - Tdap) 11/09/2031              Assessment & Plan   CMS Preventative Services Quick Reference  Risk Factors Identified During Encounter  Immunizations Discussed/Encouraged (specific Immunizations; Prevnar 20 (Pneumococcal 20-valent conjugate)  The above risks/problems have been discussed with the patient.  Follow up actions/plans if indicated are seen below in the Assessment/Plan Section.  Pertinent information has been shared with the patient in the After Visit Summary.    Diagnoses and all orders for this visit:    1. Screening mammogram, encounter for (Primary)  -     Mammo Screening Bilateral With CAD    2. Need for pneumococcal vaccine  -     Pneumococcal Conjugate Vaccine 20-Valent (PCV20)    Pap/pelvic are utd. Colonoscopy is utd. Reviewed  labs, fup in 1 yr.     Follow Up:   Return in about 1 year (around 8/12/2023) for Medicare Wellness, Lab Appt Before FUP.     An After Visit Summary and PPPS were made available to the patient.

## 2022-08-22 ENCOUNTER — HOSPITAL ENCOUNTER (OUTPATIENT)
Dept: MAMMOGRAPHY | Facility: HOSPITAL | Age: 67
Discharge: HOME | End: 2022-08-22
Admitting: PHYSICIAN ASSISTANT

## 2022-08-22 PROCEDURE — 77063 BREAST TOMOSYNTHESIS BI: CPT

## 2022-08-22 PROCEDURE — 77067 SCR MAMMO BI INCL CAD: CPT

## 2023-08-08 DIAGNOSIS — E55.9 VITAMIN D DEFICIENCY: Primary | ICD-10-CM

## 2023-08-08 DIAGNOSIS — Z13.220 SCREENING, LIPID: ICD-10-CM

## 2023-08-09 DIAGNOSIS — E87.5 HYPERKALEMIA: Primary | ICD-10-CM

## 2023-08-09 LAB
25(OH)D3+25(OH)D2 SERPL-MCNC: 47.3 NG/ML (ref 30–100)
ALBUMIN SERPL-MCNC: 4.4 G/DL (ref 3.5–5.2)
ALBUMIN/GLOB SERPL: 1.8 G/DL
ALP SERPL-CCNC: 60 U/L (ref 39–117)
ALT SERPL-CCNC: 17 U/L (ref 1–33)
AST SERPL-CCNC: 18 U/L (ref 1–32)
BILIRUB SERPL-MCNC: 0.6 MG/DL (ref 0–1.2)
BUN SERPL-MCNC: 16 MG/DL (ref 8–23)
BUN/CREAT SERPL: 21.1 (ref 7–25)
CALCIUM SERPL-MCNC: 9.8 MG/DL (ref 8.6–10.5)
CHLORIDE SERPL-SCNC: 106 MMOL/L (ref 98–107)
CHOLEST SERPL-MCNC: 195 MG/DL (ref 0–200)
CO2 SERPL-SCNC: 27.5 MMOL/L (ref 22–29)
CREAT SERPL-MCNC: 0.76 MG/DL (ref 0.57–1)
EGFRCR SERPLBLD CKD-EPI 2021: 85.5 ML/MIN/1.73
GLOBULIN SER CALC-MCNC: 2.5 GM/DL
GLUCOSE SERPL-MCNC: 100 MG/DL (ref 65–99)
HDLC SERPL-MCNC: 57 MG/DL (ref 40–60)
LDLC SERPL CALC-MCNC: 119 MG/DL (ref 0–100)
POTASSIUM SERPL-SCNC: 5.4 MMOL/L (ref 3.5–5.2)
PROT SERPL-MCNC: 6.9 G/DL (ref 6–8.5)
SODIUM SERPL-SCNC: 142 MMOL/L (ref 136–145)
TRIGL SERPL-MCNC: 107 MG/DL (ref 0–150)
VLDLC SERPL CALC-MCNC: 19 MG/DL (ref 5–40)

## 2023-08-11 LAB
BUN SERPL-MCNC: 15 MG/DL (ref 8–23)
BUN/CREAT SERPL: 20 (ref 7–25)
CALCIUM SERPL-MCNC: 9.6 MG/DL (ref 8.6–10.5)
CHLORIDE SERPL-SCNC: 104 MMOL/L (ref 98–107)
CO2 SERPL-SCNC: 26.5 MMOL/L (ref 22–29)
CREAT SERPL-MCNC: 0.75 MG/DL (ref 0.57–1)
EGFRCR SERPLBLD CKD-EPI 2021: 86.8 ML/MIN/1.73
GLUCOSE SERPL-MCNC: 85 MG/DL (ref 65–99)
POTASSIUM SERPL-SCNC: 4.3 MMOL/L (ref 3.5–5.2)
SODIUM SERPL-SCNC: 142 MMOL/L (ref 136–145)

## 2023-08-15 ENCOUNTER — OFFICE VISIT (OUTPATIENT)
Dept: INTERNAL MEDICINE | Facility: CLINIC | Age: 68
End: 2023-08-15
Payer: MEDICARE

## 2023-08-15 VITALS — WEIGHT: 141 LBS | HEIGHT: 63 IN | TEMPERATURE: 97.8 F | BODY MASS INDEX: 24.98 KG/M2

## 2023-08-15 DIAGNOSIS — Z12.31 SCREENING MAMMOGRAM, ENCOUNTER FOR: Primary | ICD-10-CM

## 2023-08-15 DIAGNOSIS — Z00.00 HEALTHCARE MAINTENANCE: ICD-10-CM

## 2023-08-15 DIAGNOSIS — E55.9 VITAMIN D DEFICIENCY: ICD-10-CM

## 2023-08-15 DIAGNOSIS — M85.88 OTHER SPECIFIED DISORDERS OF BONE DENSITY AND STRUCTURE, OTHER SITE: ICD-10-CM

## 2023-08-15 DIAGNOSIS — M85.80 OSTEOPENIA, UNSPECIFIED LOCATION: ICD-10-CM

## 2023-08-15 PROCEDURE — 1170F FXNL STATUS ASSESSED: CPT | Performed by: PHYSICIAN ASSISTANT

## 2023-08-15 PROCEDURE — 1159F MED LIST DOCD IN RCRD: CPT | Performed by: PHYSICIAN ASSISTANT

## 2023-08-15 PROCEDURE — 1160F RVW MEDS BY RX/DR IN RCRD: CPT | Performed by: PHYSICIAN ASSISTANT

## 2023-08-15 PROCEDURE — G0439 PPPS, SUBSEQ VISIT: HCPCS | Performed by: PHYSICIAN ASSISTANT

## 2023-08-15 NOTE — PROGRESS NOTES
The ABCs of the Annual Wellness Visit  Subsequent Medicare Wellness Visit    Subjective      Trinh Go is a 67 y.o. female who presents for a Subsequent Medicare Wellness Visit.    The following portions of the patient's history were reviewed and   updated as appropriate: allergies, current medications, past family history, past medical history, past social history, past surgical history, and problem list.    Compared to one year ago, the patient feels her physical   health is the same.    Compared to one year ago, the patient feels her mental   health is the same.    Recent Hospitalizations:  She was not admitted to the hospital during the last year.       Current Medical Providers:  Patient Care Team:  Rebecca Corbin PA-C as PCP - General (Physician Assistant)    Outpatient Medications Prior to Visit   Medication Sig Dispense Refill    vitamin D3 125 MCG (5000 UT) capsule capsule Take 1 capsule by mouth Daily.      Ascorbic Acid (Vitamin C) 100 MG chewable tablet Chew. (Patient not taking: Reported on 8/15/2023)      Calcipotriene-Betameth Diprop (Enstilar) 0.005-0.064 % foam Apply 1 Pump topically Daily As Needed. (Patient not taking: Reported on 8/15/2023)      Calcium Carbonate (CALCIUM 500 PO) Take 500 mg by mouth Daily. (Patient not taking: Reported on 8/15/2023)      Cats Claw, Uncaria tomentosa, (CATS CLAW PO) Take  by mouth. (Patient not taking: Reported on 8/15/2023)      clobetasol (TEMOVATE) 0.05 % external solution Apply  topically to the appropriate area as directed Daily. (Patient not taking: Reported on 8/15/2023) 50 mL 2    Probiotic Product (PROBIOTIC-10 PO) Take  by mouth. (Patient not taking: Reported on 8/15/2023)      triamcinolone (KENALOG) 0.025 % ointment Apply  topically to the appropriate area as directed 2 (Two) Times a Day. (Patient not taking: Reported on 8/15/2023) 80 g 1    Unable to find 1 each 1 (One) Time. pure omega liq (Patient not taking: Reported on 8/15/2023)       No  "facility-administered medications prior to visit.       No opioid medication identified on active medication list. I have reviewed chart for other potential  high risk medication/s and harmful drug interactions in the elderly.        Aspirin is not on active medication list.  Aspirin use is not indicated based on review of current medical condition/s. Risk of harm outweighs potential benefits.  .    Patient Active Problem List   Diagnosis    Arthritis of knee    Effusion of knee    Current tear knee, medial meniscus    Vitamin D deficiency    Psoriasis    Encounter for screening for malignant neoplasm of colon    Family history of colon cancer    Osteopenia     Advance Care Planning   Advance Care Planning     Advance Directive is not on file.  ACP discussion was held with the patient during this visit. Patient has an advance directive (not in EMR), copy requested.     Objective    Vitals:    08/15/23 1035   Temp: 97.8 øF (36.6 øC)   Weight: 64 kg (141 lb)   Height: 160 cm (62.99\")     Estimated body mass index is 24.98 kg/mý as calculated from the following:    Height as of this encounter: 160 cm (62.99\").    Weight as of this encounter: 64 kg (141 lb).    BMI is within normal parameters. No other follow-up for BMI required.      Does the patient have evidence of cognitive impairment?   No    Lab Results   Component Value Date    CHLPL 195 2023    TRIG 107 2023    HDL 57 2023     (H) 2023    VLDL 19 2023          HEALTH RISK ASSESSMENT    Smoking Status:  Social History     Tobacco Use   Smoking Status Former    Packs/day: 1.00    Types: Cigarettes    Quit date:     Years since quittin.6   Smokeless Tobacco Never     Alcohol Consumption:  Social History     Substance and Sexual Activity   Alcohol Use Yes    Alcohol/week: 7.0 standard drinks    Types: 7 Glasses of wine per week     Fall Risk Screen:    STEADI Fall Risk Assessment was completed, and patient is at MODERATE " risk for falls. Assessment completed on:8/15/2023    Depression Screenin/15/2023    10:38 AM   PHQ-2/PHQ-9 Depression Screening   Little Interest or Pleasure in Doing Things 0-->not at all   Feeling Down, Depressed or Hopeless 0-->not at all   PHQ-9: Brief Depression Severity Measure Score 0       Health Habits and Functional and Cognitive Screenin/15/2023    10:37 AM   Functional & Cognitive Status   Do you have difficulty preparing food and eating? No   Do you have difficulty bathing yourself, getting dressed or grooming yourself? No   Do you have difficulty using the toilet? No   Do you have difficulty moving around from place to place? No   Do you have trouble with steps or getting out of a bed or a chair? No   Current Diet Well Balanced Diet   Dental Exam Up to date   Eye Exam Up to date   Exercise (times per week) 5 times per week   Current Exercises Include Walking;Pickleball   Do you need help using the phone?  No   Are you deaf or do you have serious difficulty hearing?  No   Do you need help to go to places out of walking distance? No   Do you need help shopping? No   Do you need help preparing meals?  No   Do you need help with housework?  No   Do you need help with laundry? No   Do you need help taking your medications? No   Do you need help managing money? No   Do you ever drive or ride in a car without wearing a seat belt? No       Age-appropriate Screening Schedule:  Refer to the list below for future screening recommendations based on patient's age, sex and/or medical conditions. Orders for these recommended tests are listed in the plan section. The patient has been provided with a written plan.    Health Maintenance   Topic Date Due    ZOSTER VACCINE (1 of 2) Never done    COVID-19 Vaccine (5 - Pfizer series) 2023    ANNUAL WELLNESS VISIT  2023    INFLUENZA VACCINE  10/01/2023    DXA SCAN  11/10/2023    MAMMOGRAM  2024    PAP SMEAR  2024    COLORECTAL CANCER  SCREENING  12/09/2031    TDAP/TD VACCINES (3 - Td or Tdap) 11/18/2032    HEPATITIS C SCREENING  Completed    Pneumococcal Vaccine 65+  Completed                Physical Exam  Vitals reviewed.   Constitutional:       Appearance: She is well-developed.   HENT:      Head: Normocephalic and atraumatic.   Neck:      Vascular: No carotid bruit.   Cardiovascular:      Rate and Rhythm: Normal rate and regular rhythm.      Heart sounds: Normal heart sounds, S1 normal and S2 normal.   Pulmonary:      Effort: Pulmonary effort is normal.      Breath sounds: Normal breath sounds.   Skin:     General: Skin is warm.   Neurological:      Mental Status: She is alert.   Psychiatric:         Mood and Affect: Mood normal.         Behavior: Behavior normal.         Thought Content: Thought content normal.         Judgment: Judgment normal.         CMS Preventative Services Quick Reference  Risk Factors Identified During Encounter:    None Identified    The above risks/problems have been discussed with the patient.  Pertinent information has been shared with the patient in the After Visit Summary.    Diagnoses and all orders for this visit:    1. Screening mammogram, encounter for (Primary)  -     Mammo Screening Bilateral With CAD    2. Osteopenia, unspecified location  -     DEXA Bone Density Axial    3. Other specified disorders of bone density and structure, other site  -     DEXA Bone Density Axial    4. Vitamin D deficiency  -     Vitamin D,25-Hydroxy; Future    5. Healthcare maintenance  -     Comprehensive Metabolic Panel; Future  -     Lipid Panel With / Chol / HDL Ratio; Future    Lab are excellent. Colonoscopy is utd. Due for mammogram and dexa will order today.     Follow Up:   Next Medicare Wellness visit to be scheduled in 1 year.      An After Visit Summary and PPPS were made available to the patient.

## 2023-08-31 ENCOUNTER — TELEPHONE (OUTPATIENT)
Dept: INTERNAL MEDICINE | Facility: CLINIC | Age: 68
End: 2023-08-31

## 2023-08-31 NOTE — TELEPHONE ENCOUNTER
Called patient and was advised by Dr. Oliva to seek a gynecologist. She is aware Rebecca is not here today and that she will be back tomorrow

## 2023-08-31 NOTE — TELEPHONE ENCOUNTER
Caller: Trinh Go    Relationship: Self    Best call back number: [phone]    What is the best time to reach you: ANYTIME     Who are you requesting to speak with (clinical staff, provider,  specific staff member): CLINICAL STAFF     Do you know the name of the person who called: SELF     What was the call regarding: PATIENT CALLED AND STATED SHE HAS HAD SOME VAGINAL BLEEDING AND FOUND A CYST IN THE OPENING OF VAGINA AND WOULD LIKE FOR YOU TO CALL HER . PLEASE CALL. THANKS     Is it okay if the provider responds through MyChart: YES

## 2023-09-06 ENCOUNTER — OFFICE VISIT (OUTPATIENT)
Dept: OBSTETRICS AND GYNECOLOGY | Age: 68
End: 2023-09-06
Payer: MEDICARE

## 2023-09-06 ENCOUNTER — PREP FOR SURGERY (OUTPATIENT)
Dept: OTHER | Facility: HOSPITAL | Age: 68
End: 2023-09-06
Payer: MEDICARE

## 2023-09-06 VITALS
SYSTOLIC BLOOD PRESSURE: 112 MMHG | WEIGHT: 138.6 LBS | HEIGHT: 63 IN | DIASTOLIC BLOOD PRESSURE: 72 MMHG | BODY MASS INDEX: 24.56 KG/M2

## 2023-09-06 DIAGNOSIS — N84.0 ENDOMETRIAL POLYP: ICD-10-CM

## 2023-09-06 DIAGNOSIS — L40.9 PSORIASIS: ICD-10-CM

## 2023-09-06 DIAGNOSIS — N95.0 PMB (POSTMENOPAUSAL BLEEDING): Primary | ICD-10-CM

## 2023-09-06 RX ORDER — SODIUM CHLORIDE 0.9 % (FLUSH) 0.9 %
1-10 SYRINGE (ML) INJECTION AS NEEDED
OUTPATIENT
Start: 2023-09-06

## 2023-09-06 RX ORDER — SODIUM CHLORIDE 0.9 % (FLUSH) 0.9 %
10 SYRINGE (ML) INJECTION EVERY 12 HOURS SCHEDULED
OUTPATIENT
Start: 2023-09-06

## 2023-09-06 RX ORDER — SODIUM CHLORIDE 9 MG/ML
40 INJECTION, SOLUTION INTRAVENOUS AS NEEDED
OUTPATIENT
Start: 2023-09-06

## 2023-09-06 NOTE — PROGRESS NOTES
[facility]   Obstetrics and Gynecology   New Gynecology Visit    2023    Patient: Trinh Go          MR#:[id number]    History of Present Illness    Chief Complaint   Patient presents with    Gynecologic Exam     New gyn, vaginal lesion that was causing some bleeding, pt states she has not had a prolapsed uterus after her last child 30 years ago which was the last time she saw a gyn       Patient plans to see Dr. Foote   67 y.o. female  who presents with vaginal bleeding that was bright red that she used a panty liner for this lasted 24 hrs  she has been seeing her pcp for wellness exams she looked with a mirror and reports this was irritated looking tissue this happened 23 last pap  that was NIL  She has psoriasis and this is noted to her body in varying areas today   She is under a considerable amount of stress, her granddaughter had a miscarriage and her mother had a stroke this past week    Studies reviewed:      Obstetric History:  OB History          2    Para   2    Term   2            AB        Living   2         SAB        IAB        Ectopic        Molar        Multiple        Live Births   2               Menstrual History:     No LMP recorded. Patient is postmenopausal.       Sexual History:         Social History:    ________________________________________  Patient Active Problem List   Diagnosis    Arthritis of knee    Effusion of knee    Current tear knee, medial meniscus    Vitamin D deficiency    Psoriasis    Encounter for screening for malignant neoplasm of colon    Family history of colon cancer    Osteopenia    PMB (postmenopausal bleeding)     History reviewed. No pertinent past medical history.  Past Surgical History:   Procedure Laterality Date    COLONOSCOPY N/A 2021    Procedure: COLONOSCOPY;  Surgeon: Xavi Briceño MD;  Location: [facility] MAIN OR;  Service: Gastroenterology;  Laterality: N/A;    FRACTURE SURGERY       "FRACTURE SURGERY Right     arm    KNEE SURGERY      SHOULDER MANIPULATION Right      Social History     Tobacco Use   Smoking Status Former    Packs/day: 1.00    Types: Cigarettes    Quit date:     Years since quittin.6   Smokeless Tobacco Never     Family History   Problem Relation Age of Onset    Hypertension Mother     Hyperlipidemia Mother     Cancer Father         leukemia    Diabetes Father     Stroke Brother     Colon cancer Maternal Grandmother     Breast cancer Paternal Grandmother         70s     Prior to Admission medications    Medication Sig Start Date End Date Taking? Authorizing Provider   vitamin D3 125 MCG (5000 UT) capsule capsule Take 1 capsule by mouth Daily.   Yes Provider, MD Jose   triamcinolone (KENALOG) 0.1 % ointment Apply 1 application  topically to the appropriate area as directed 2 (Two) Times a Day. 23   Myrna Jones APRN     ________________________________________    The following portions of the patient's history were reviewed and updated as appropriate: allergies, current medications, past family history, past medical history, past social history, past surgical history, and problem list.    Review of Systems         Objective     /72   Ht 160 cm (62.99\")   Wt 62.9 kg (138 lb 9.6 oz)   BMI 24.56 kg/m²    BP Readings from Last 3 Encounters:   23 112/72   22 110/70   21 109/73      Wt Readings from Last 3 Encounters:   23 62.9 kg (138 lb 9.6 oz)   08/15/23 64 kg (141 lb)   22 60.8 kg (134 lb)        BMI: Estimated body mass index is 24.56 kg/m² as calculated from the following:    Height as of this encounter: 160 cm (62.99\").    Weight as of this encounter: 62.9 kg (138 lb 9.6 oz).    Physical Exam  Genitourinary:         Comments: Plaques of psoriasis   Skin:            Comments: Psoriasis plaques               Assessment:  Diagnoses and all orders for this visit:    1. PMB (postmenopausal bleeding) (Primary)    2. " Psoriasis  -     triamcinolone (KENALOG) 0.1 % ointment; Apply 1 application  topically to the appropriate area as directed 2 (Two) Times a Day.  Dispense: 45 g; Refill: 1      Impression:    1.  Uterus: Normal size, with uterine volume of 10.9 ml, with uterine dimensions 3.4 x 3.2 x 1.9 cm, and Anteverted    2.  Endometrium: Normal non-menopausal thickness, 2.1 mm , and Echogenic foci suspect for endometrial polyp    3.  Myometrium: Normal homogenous texture     4.  Ovaries   Left: Normal/unremarkable    Right: Normal/unremarkable       Plan: we discussed importance of investigation of pmb, given thin endometrium this is reassuring however a polyp is noted defer emb will get hysteroscopy with Dr. Foote she discussed this with the patient today all questions answered and addressed, encouraged her to have yearly visits will try triamcinolone for psoarisis   No follow-ups on file.      Myrna Jones, DEMETRIS  9/6/2023 16:12 EDT

## 2023-09-06 NOTE — PROGRESS NOTES
New GYN Problem    Chief Complaint   Patient presents with    Vaginal Bleeding     Bright red vaginal bleeding recently     Trinh Go is a 57-year-old -0-0-2 who presents with postmenopausal bleeding.  She notes that a week or 2 ago she had 1 day of bright red bleeding that required a panty liner.  The bleeding did stop and she has not had other episodes.  She did not have any associated symptoms  Her mother recently had a stroke and lives in [place], they are trying to figure out care for her    Histories  No past medical history on file.    Past Surgical History:   Procedure Laterality Date    COLONOSCOPY N/A 2021    Procedure: COLONOSCOPY;  Surgeon: Xavi Briceño MD;  [place] [facility] [place]  Service: Gastroenterology;  Laterality: N/A;    FRACTURE SURGERY      FRACTURE SURGERY Right     arm    KNEE SURGERY      SHOULDER MANIPULATION Right        Family History   Problem Relation Age of Onset    Hypertension Mother     Hyperlipidemia Mother     Cancer Father         leukemia    Diabetes Father     Stroke Brother     Colon cancer Maternal Grandmother     Breast cancer Paternal Grandmother         70s       Social History     Socioeconomic History    Marital status:    Tobacco Use    Smoking status: Former     Packs/day: 1.00     Types: Cigarettes     Quit date:      Years since quittin.6    Smokeless tobacco: Never   Vaping Use    Vaping Use: Never used   Substance and Sexual Activity    Alcohol use: Yes     Alcohol/week: 7.0 standard drinks     Types: 7 Glasses of wine per week    Drug use: Never    Sexual activity: Yes     Partners: Male     Birth control/protection: Post-menopausal       OB History          2    Para   2    Term   2            AB        Living   2         SAB        IAB        Ectopic        Molar        Multiple        Live Births   2                Physical Exam    There were no vitals taken for this visit.    BMI: There is no height or  weight on file to calculate BMI.     General:   alert, appears stated age, and cooperative   Neck Nontender, no lymphadenopathy, no thyromegaly   Lung lungs clear to auscultation, no wheezes or rhonchi   Heart: heart regular rate and rhythm, no murmurs   Abdomen: soft, non-tender, without masses or organomegaly     Pelvic exam by DEMETRIS Jones today    Assessment/Plan    Diagnoses and all orders for this visit:    1. PMB (postmenopausal bleeding) (Primary)    2. Endometrial polyp      Under ultrasound she has an area suspicious for endometrial polyp.  I recommended hysteroscopy, D&C to remove the polyp and also perform a general sampling of the endometrium to rule out hyperplasia or cancer.  Discussed possible risks, discussed usual postoperative course.  She desires to proceed.      Darlene Foote MD  09/06/2023  16:20 EDT

## 2023-09-07 ENCOUNTER — HOSPITAL ENCOUNTER (OUTPATIENT)
Dept: MAMMOGRAPHY | Facility: HOSPITAL | Age: 68
Discharge: HOME | End: 2023-09-07
Admitting: PHYSICIAN ASSISTANT
Payer: MEDICARE

## 2023-09-07 PROCEDURE — 77063 BREAST TOMOSYNTHESIS BI: CPT

## 2023-09-07 PROCEDURE — 77067 SCR MAMMO BI INCL CAD: CPT

## 2023-10-09 ENCOUNTER — PRE-ADMISSION TESTING (OUTPATIENT)
Dept: PREADMISSION TESTING | Facility: HOSPITAL | Age: 68
End: 2023-10-09
Payer: MEDICARE

## 2023-10-09 VITALS
DIASTOLIC BLOOD PRESSURE: 84 MMHG | WEIGHT: 141 LBS | TEMPERATURE: 98.7 F | HEART RATE: 82 BPM | RESPIRATION RATE: 16 BRPM | BODY MASS INDEX: 25.95 KG/M2 | OXYGEN SATURATION: 98 % | HEIGHT: 62 IN | SYSTOLIC BLOOD PRESSURE: 135 MMHG

## 2023-10-09 LAB
ANION GAP SERPL CALCULATED.3IONS-SCNC: 15.2 MMOL/L (ref 5–15)
BUN SERPL-MCNC: 22 MG/DL (ref 8–23)
BUN/CREAT SERPL: 31 (ref 7–25)
CALCIUM SPEC-SCNC: 9.5 MG/DL (ref 8.6–10.5)
CHLORIDE SERPL-SCNC: 103 MMOL/L (ref 98–107)
CO2 SERPL-SCNC: 24.8 MMOL/L (ref 22–29)
CREAT SERPL-MCNC: 0.71 MG/DL (ref 0.57–1)
DEPRECATED RDW RBC AUTO: 40.4 FL (ref 37–54)
EGFRCR SERPLBLD CKD-EPI 2021: 92.7 ML/MIN/1.73
ERYTHROCYTE [DISTWIDTH] IN BLOOD BY AUTOMATED COUNT: 11.8 % (ref 12.3–15.4)
GLUCOSE SERPL-MCNC: 91 MG/DL (ref 65–99)
HCT VFR BLD AUTO: 39.5 % (ref 34–46.6)
HGB BLD-MCNC: 13.4 G/DL (ref 12–15.9)
MCH RBC QN AUTO: 31.9 PG (ref 26.6–33)
MCHC RBC AUTO-ENTMCNC: 33.9 G/DL (ref 31.5–35.7)
MCV RBC AUTO: 94 FL (ref 79–97)
PLATELET # BLD AUTO: 250 10*3/MM3 (ref 140–450)
PMV BLD AUTO: 9.7 FL (ref 6–12)
POTASSIUM SERPL-SCNC: 4.4 MMOL/L (ref 3.5–5.2)
QT INTERVAL: 397 MS
QTC INTERVAL: 423 MS
RBC # BLD AUTO: 4.2 10*6/MM3 (ref 3.77–5.28)
SODIUM SERPL-SCNC: 143 MMOL/L (ref 136–145)
WBC NRBC COR # BLD: 8.9 10*3/MM3 (ref 3.4–10.8)

## 2023-10-09 PROCEDURE — 36415 COLL VENOUS BLD VENIPUNCTURE: CPT

## 2023-10-09 PROCEDURE — 93005 ELECTROCARDIOGRAM TRACING: CPT

## 2023-10-09 PROCEDURE — 85027 COMPLETE CBC AUTOMATED: CPT

## 2023-10-09 PROCEDURE — 80048 BASIC METABOLIC PNL TOTAL CA: CPT

## 2023-10-09 NOTE — DISCHARGE INSTRUCTIONS
Take the following medications the morning of surgery:  NONE    ARRIVAL TIME 0900 ON 10/12/23       If you are on prescription narcotic pain medication to control your pain you may also take that medication the morning of surgery.    General Instructions:  Do not eat solid food after midnight the night before surgery.  You may drink clear liquids day of surgery but must stop at least one hour before your hospital arrival time.  It is beneficial for you to have a clear drink that contains carbohydrates the day of surgery.  We suggest a 12 to 20 ounce bottle of Gatorade or Powerade for non-diabetic patients or a 12 to 20 ounce bottle of G2 or Powerade Zero for diabetic patients. (Pediatric patients, are not advised to drink a 12 to 20 ounce carbohydrate drink)    Clear liquids are liquids you can see through.  Nothing red in color.     Plain water                               Sports drinks  Sodas                                   Gelatin (Jell-O)  Fruit juices without pulp such as white grape juice and apple juice  Popsicles that contain no fruit or yogurt  Tea or coffee (no cream or milk added)  Gatorade / Powerade  G2 / Powerade Zero    Infants may have breast milk up to four hours before surgery.  Infants drinking formula may drink formula up to six hours before surgery.   Patients who avoid smoking, chewing tobacco and alcohol for 4 weeks prior to surgery have a reduced risk of post-operative complications.  Quit smoking as many days before surgery as you can.  Do not smoke, use chewing tobacco or drink alcohol the day of surgery.   If applicable bring your C-PAP/ BI-PAP machine in with you to preop day of surgery.  Bring any papers given to you in the doctor's office.  Wear clean comfortable clothes.  Do not wear contact lenses, false eyelashes or make-up.  Bring a case for your glasses.   Bring crutches or walker if applicable.  Remove all piercings.  Leave jewelry and any other valuables at home.  Hair  extensions with metal clips must be removed prior to surgery.  The Pre-Admission Testing nurse will instruct you to bring medications if unable to obtain an accurate list in Pre-Admission Testing.        If you were given a blood bank ID arm band remember to bring it with you the day of surgery.    Preventing a Surgical Site Infection:  For 2 to 3 days before surgery, avoid shaving with a razor because the razor can irritate skin and make it easier to develop an infection.    Any areas of open skin can increase the risk of a post-operative wound infection by allowing bacteria to enter and travel throughout the body.  Notify your surgeon if you have any skin wounds / rashes even if it is not near the expected surgical site.  The area will need assessed to determine if surgery should be delayed until it is healed.  The night prior to surgery shower using a fresh bar of anti-bacterial soap (such as Dial) and clean washcloth.  Sleep in a clean bed with clean clothing.  Do not allow pets to sleep with you.  Shower on the morning of surgery using a fresh bar of anti-bacterial soap (such as Dial) and clean washcloth.  Dry with a clean towel and dress in clean clothing.  Ask your surgeon if you will be receiving antibiotics prior to surgery.  Make sure you, your family, and all healthcare providers clean their hands with soap and water or an alcohol based hand  before caring for you or your wound.    Day of surgery:  Your arrival time is approximately two hours before your scheduled surgery time.  Upon arrival, a Pre-op nurse and Anesthesiologist will review your health history, obtain vital signs, and answer questions you may have.  The only belongings needed at this time will be a list of your home medications and if applicable your C-PAP/BI-PAP machine.  A Pre-op nurse will start an IV and you may receive medication in preparation for surgery, including something to help you relax.     Please be aware that  surgery does come with discomfort.  We want to make every effort to control your discomfort so please discuss any uncontrolled symptoms with your nurse.   Your doctor will most likely have prescribed pain medications.      If you are going home after surgery you will receive individualized written care instructions before being discharged.  A responsible adult must drive you to and from the hospital on the day of your surgery and stay with you for 24 hours.  Discharge prescriptions can be filled by the hospital pharmacy during regular pharmacy hours.  If you are having surgery late in the day/evening your prescription may be e-prescribed to your pharmacy.  Please verify your pharmacy hours or chose a 24 hour pharmacy to avoid not having access to your prescription because your pharmacy has closed for the day.    If you are staying overnight following surgery, you will be transported to your hospital room following the recovery period.  [facility] has all private rooms.    If you have any questions please call Pre-Admission Testing at [phone].  Deductibles and co-payments are collected on the day of service. Please be prepared to pay the required co-pay, deductible or deposit on the day of service as defined by your plan.    Call your surgeon immediately if you experience any of the following symptoms:  Sore Throat  Shortness of Breath or difficulty breathing  Cough  Chills  Body soreness or muscle pain  Headache  Fever  New loss of taste or smell  Do not arrive for your surgery ill.  Your procedure will need to be rescheduled to another time.  You will need to call your physician before the day of surgery to avoid any unnecessary exposure to hospital staff as well as other patients.

## 2023-10-12 ENCOUNTER — HOSPITAL ENCOUNTER (OUTPATIENT)
Facility: HOSPITAL | Age: 68
Setting detail: HOSPITAL OUTPATIENT SURGERY
Discharge: HOME | End: 2023-10-12
Attending: OBSTETRICS & GYNECOLOGY | Admitting: OBSTETRICS & GYNECOLOGY
Payer: MEDICARE

## 2023-10-12 ENCOUNTER — ANESTHESIA (OUTPATIENT)
Dept: PERIOP | Facility: HOSPITAL | Age: 68
End: 2023-10-12
Payer: MEDICARE

## 2023-10-12 ENCOUNTER — ANESTHESIA EVENT (OUTPATIENT)
Dept: PERIOP | Facility: HOSPITAL | Age: 68
End: 2023-10-12
Payer: MEDICARE

## 2023-10-12 VITALS
SYSTOLIC BLOOD PRESSURE: 162 MMHG | RESPIRATION RATE: 16 BRPM | OXYGEN SATURATION: 100 % | DIASTOLIC BLOOD PRESSURE: 97 MMHG | TEMPERATURE: 97.4 F | HEART RATE: 70 BPM

## 2023-10-12 DIAGNOSIS — Z98.890 STATUS POST HYSTEROSCOPY: Primary | ICD-10-CM

## 2023-10-12 DIAGNOSIS — N95.0 PMB (POSTMENOPAUSAL BLEEDING): ICD-10-CM

## 2023-10-12 PROCEDURE — 25010000002 PROPOFOL 200 MG/20ML EMULSION: Performed by: NURSE ANESTHETIST, CERTIFIED REGISTERED

## 2023-10-12 PROCEDURE — 25810000003 LACTATED RINGERS PER 1000 ML: Performed by: ANESTHESIOLOGY

## 2023-10-12 PROCEDURE — 88342 IMHCHEM/IMCYTCHM 1ST ANTB: CPT | Performed by: OBSTETRICS & GYNECOLOGY

## 2023-10-12 PROCEDURE — 25010000002 FENTANYL CITRATE (PF) 50 MCG/ML SOLUTION: Performed by: NURSE ANESTHETIST, CERTIFIED REGISTERED

## 2023-10-12 PROCEDURE — C1782 MORCELLATOR: HCPCS | Performed by: OBSTETRICS & GYNECOLOGY

## 2023-10-12 PROCEDURE — S0260 H&P FOR SURGERY: HCPCS | Performed by: OBSTETRICS & GYNECOLOGY

## 2023-10-12 PROCEDURE — 88305 TISSUE EXAM BY PATHOLOGIST: CPT | Performed by: OBSTETRICS & GYNECOLOGY

## 2023-10-12 PROCEDURE — 25010000002 ONDANSETRON PER 1 MG: Performed by: NURSE ANESTHETIST, CERTIFIED REGISTERED

## 2023-10-12 PROCEDURE — 58558 HYSTEROSCOPY BIOPSY: CPT | Performed by: OBSTETRICS & GYNECOLOGY

## 2023-10-12 PROCEDURE — 25010000002 DEXAMETHASONE SODIUM PHOSPHATE 20 MG/5ML SOLUTION: Performed by: NURSE ANESTHETIST, CERTIFIED REGISTERED

## 2023-10-12 PROCEDURE — 88341 IMHCHEM/IMCYTCHM EA ADD ANTB: CPT | Performed by: OBSTETRICS & GYNECOLOGY

## 2023-10-12 RX ORDER — SODIUM CHLORIDE 0.9 % (FLUSH) 0.9 %
3 SYRINGE (ML) INJECTION EVERY 12 HOURS SCHEDULED
Status: DISCONTINUED | OUTPATIENT
Start: 2023-10-12 | End: 2023-10-12 | Stop reason: HOSPADM

## 2023-10-12 RX ORDER — PROMETHAZINE HYDROCHLORIDE 25 MG/1
25 SUPPOSITORY RECTAL ONCE AS NEEDED
Status: DISCONTINUED | OUTPATIENT
Start: 2023-10-12 | End: 2023-10-12 | Stop reason: HOSPADM

## 2023-10-12 RX ORDER — DROPERIDOL 2.5 MG/ML
0.62 INJECTION, SOLUTION INTRAMUSCULAR; INTRAVENOUS
Status: DISCONTINUED | OUTPATIENT
Start: 2023-10-12 | End: 2023-10-12 | Stop reason: HOSPADM

## 2023-10-12 RX ORDER — SODIUM CHLORIDE 0.9 % (FLUSH) 0.9 %
1-10 SYRINGE (ML) INJECTION AS NEEDED
Status: DISCONTINUED | OUTPATIENT
Start: 2023-10-12 | End: 2023-10-12 | Stop reason: HOSPADM

## 2023-10-12 RX ORDER — IBUPROFEN 600 MG/1
600 TABLET ORAL EVERY 6 HOURS PRN
Qty: 20 TABLET | Refills: 0 | Status: SHIPPED | OUTPATIENT
Start: 2023-10-12

## 2023-10-12 RX ORDER — MIDAZOLAM HYDROCHLORIDE 1 MG/ML
0.5 INJECTION INTRAMUSCULAR; INTRAVENOUS
Status: DISCONTINUED | OUTPATIENT
Start: 2023-10-12 | End: 2023-10-12 | Stop reason: HOSPADM

## 2023-10-12 RX ORDER — NALOXONE HCL 0.4 MG/ML
0.2 VIAL (ML) INJECTION AS NEEDED
Status: DISCONTINUED | OUTPATIENT
Start: 2023-10-12 | End: 2023-10-12 | Stop reason: HOSPADM

## 2023-10-12 RX ORDER — LABETALOL HYDROCHLORIDE 5 MG/ML
5 INJECTION, SOLUTION INTRAVENOUS
Status: DISCONTINUED | OUTPATIENT
Start: 2023-10-12 | End: 2023-10-12 | Stop reason: HOSPADM

## 2023-10-12 RX ORDER — LIDOCAINE HYDROCHLORIDE 20 MG/ML
INJECTION, SOLUTION INFILTRATION; PERINEURAL AS NEEDED
Status: DISCONTINUED | OUTPATIENT
Start: 2023-10-12 | End: 2023-10-12 | Stop reason: SURG

## 2023-10-12 RX ORDER — FLUMAZENIL 0.1 MG/ML
0.2 INJECTION INTRAVENOUS AS NEEDED
Status: DISCONTINUED | OUTPATIENT
Start: 2023-10-12 | End: 2023-10-12 | Stop reason: HOSPADM

## 2023-10-12 RX ORDER — EPHEDRINE SULFATE 50 MG/ML
5 INJECTION, SOLUTION INTRAVENOUS ONCE AS NEEDED
Status: DISCONTINUED | OUTPATIENT
Start: 2023-10-12 | End: 2023-10-12 | Stop reason: HOSPADM

## 2023-10-12 RX ORDER — HYDROCODONE BITARTRATE AND ACETAMINOPHEN 5; 325 MG/1; MG/1
1-2 TABLET ORAL EVERY 4 HOURS PRN
Qty: 7 TABLET | Refills: 0 | Status: SHIPPED | OUTPATIENT
Start: 2023-10-12

## 2023-10-12 RX ORDER — MAGNESIUM HYDROXIDE 1200 MG/15ML
LIQUID ORAL AS NEEDED
Status: DISCONTINUED | OUTPATIENT
Start: 2023-10-12 | End: 2023-10-12 | Stop reason: HOSPADM

## 2023-10-12 RX ORDER — FENTANYL CITRATE 50 UG/ML
INJECTION, SOLUTION INTRAMUSCULAR; INTRAVENOUS AS NEEDED
Status: DISCONTINUED | OUTPATIENT
Start: 2023-10-12 | End: 2023-10-12 | Stop reason: SURG

## 2023-10-12 RX ORDER — ONDANSETRON 2 MG/ML
4 INJECTION INTRAMUSCULAR; INTRAVENOUS ONCE AS NEEDED
Status: DISCONTINUED | OUTPATIENT
Start: 2023-10-12 | End: 2023-10-12 | Stop reason: HOSPADM

## 2023-10-12 RX ORDER — HYDROCODONE BITARTRATE AND ACETAMINOPHEN 7.5; 325 MG/1; MG/1
1 TABLET ORAL EVERY 4 HOURS PRN
Status: DISCONTINUED | OUTPATIENT
Start: 2023-10-12 | End: 2023-10-12 | Stop reason: HOSPADM

## 2023-10-12 RX ORDER — DEXAMETHASONE SODIUM PHOSPHATE 4 MG/ML
INJECTION, SOLUTION INTRA-ARTICULAR; INTRALESIONAL; INTRAMUSCULAR; INTRAVENOUS; SOFT TISSUE AS NEEDED
Status: DISCONTINUED | OUTPATIENT
Start: 2023-10-12 | End: 2023-10-12 | Stop reason: SURG

## 2023-10-12 RX ORDER — ONDANSETRON 2 MG/ML
INJECTION INTRAMUSCULAR; INTRAVENOUS AS NEEDED
Status: DISCONTINUED | OUTPATIENT
Start: 2023-10-12 | End: 2023-10-12 | Stop reason: SURG

## 2023-10-12 RX ORDER — HYDRALAZINE HYDROCHLORIDE 20 MG/ML
5 INJECTION INTRAMUSCULAR; INTRAVENOUS
Status: DISCONTINUED | OUTPATIENT
Start: 2023-10-12 | End: 2023-10-12 | Stop reason: HOSPADM

## 2023-10-12 RX ORDER — SODIUM CHLORIDE, SODIUM LACTATE, POTASSIUM CHLORIDE, CALCIUM CHLORIDE 600; 310; 30; 20 MG/100ML; MG/100ML; MG/100ML; MG/100ML
9 INJECTION, SOLUTION INTRAVENOUS CONTINUOUS
Status: DISCONTINUED | OUTPATIENT
Start: 2023-10-12 | End: 2023-10-12 | Stop reason: HOSPADM

## 2023-10-12 RX ORDER — PROMETHAZINE HYDROCHLORIDE 25 MG/1
25 TABLET ORAL ONCE AS NEEDED
Status: DISCONTINUED | OUTPATIENT
Start: 2023-10-12 | End: 2023-10-12 | Stop reason: HOSPADM

## 2023-10-12 RX ORDER — DIPHENHYDRAMINE HYDROCHLORIDE 50 MG/ML
12.5 INJECTION INTRAMUSCULAR; INTRAVENOUS
Status: DISCONTINUED | OUTPATIENT
Start: 2023-10-12 | End: 2023-10-12 | Stop reason: HOSPADM

## 2023-10-12 RX ORDER — HYDROMORPHONE HYDROCHLORIDE 1 MG/ML
0.25 INJECTION, SOLUTION INTRAMUSCULAR; INTRAVENOUS; SUBCUTANEOUS
Status: DISCONTINUED | OUTPATIENT
Start: 2023-10-12 | End: 2023-10-12 | Stop reason: HOSPADM

## 2023-10-12 RX ORDER — FAMOTIDINE 10 MG/ML
20 INJECTION, SOLUTION INTRAVENOUS ONCE
Status: COMPLETED | OUTPATIENT
Start: 2023-10-12 | End: 2023-10-12

## 2023-10-12 RX ORDER — IPRATROPIUM BROMIDE AND ALBUTEROL SULFATE 2.5; .5 MG/3ML; MG/3ML
3 SOLUTION RESPIRATORY (INHALATION) ONCE AS NEEDED
Status: DISCONTINUED | OUTPATIENT
Start: 2023-10-12 | End: 2023-10-12 | Stop reason: HOSPADM

## 2023-10-12 RX ORDER — IBUPROFEN 600 MG/1
600 TABLET ORAL EVERY 6 HOURS PRN
Status: DISCONTINUED | OUTPATIENT
Start: 2023-10-12 | End: 2023-10-12 | Stop reason: HOSPADM

## 2023-10-12 RX ORDER — HYDROCODONE BITARTRATE AND ACETAMINOPHEN 5; 325 MG/1; MG/1
1 TABLET ORAL ONCE AS NEEDED
Status: DISCONTINUED | OUTPATIENT
Start: 2023-10-12 | End: 2023-10-12 | Stop reason: HOSPADM

## 2023-10-12 RX ORDER — SODIUM CHLORIDE 0.9 % (FLUSH) 0.9 %
3-10 SYRINGE (ML) INJECTION AS NEEDED
Status: DISCONTINUED | OUTPATIENT
Start: 2023-10-12 | End: 2023-10-12 | Stop reason: HOSPADM

## 2023-10-12 RX ORDER — LIDOCAINE HYDROCHLORIDE 10 MG/ML
0.5 INJECTION, SOLUTION INFILTRATION; PERINEURAL ONCE AS NEEDED
Status: DISCONTINUED | OUTPATIENT
Start: 2023-10-12 | End: 2023-10-12 | Stop reason: HOSPADM

## 2023-10-12 RX ORDER — FENTANYL CITRATE 50 UG/ML
50 INJECTION, SOLUTION INTRAMUSCULAR; INTRAVENOUS ONCE AS NEEDED
Status: DISCONTINUED | OUTPATIENT
Start: 2023-10-12 | End: 2023-10-12 | Stop reason: HOSPADM

## 2023-10-12 RX ORDER — SODIUM CHLORIDE 0.9 % (FLUSH) 0.9 %
10 SYRINGE (ML) INJECTION EVERY 12 HOURS SCHEDULED
Status: DISCONTINUED | OUTPATIENT
Start: 2023-10-12 | End: 2023-10-12 | Stop reason: HOSPADM

## 2023-10-12 RX ORDER — FENTANYL CITRATE 50 UG/ML
25 INJECTION, SOLUTION INTRAMUSCULAR; INTRAVENOUS
Status: DISCONTINUED | OUTPATIENT
Start: 2023-10-12 | End: 2023-10-12 | Stop reason: HOSPADM

## 2023-10-12 RX ORDER — SODIUM CHLORIDE 9 MG/ML
40 INJECTION, SOLUTION INTRAVENOUS AS NEEDED
Status: DISCONTINUED | OUTPATIENT
Start: 2023-10-12 | End: 2023-10-12 | Stop reason: HOSPADM

## 2023-10-12 RX ORDER — PROPOFOL 10 MG/ML
INJECTION, EMULSION INTRAVENOUS AS NEEDED
Status: DISCONTINUED | OUTPATIENT
Start: 2023-10-12 | End: 2023-10-12 | Stop reason: SURG

## 2023-10-12 RX ADMIN — PROPOFOL 150 MG: 10 INJECTION, EMULSION INTRAVENOUS at 11:15

## 2023-10-12 RX ADMIN — FENTANYL CITRATE 50 MCG: 50 INJECTION, SOLUTION INTRAMUSCULAR; INTRAVENOUS at 11:19

## 2023-10-12 RX ADMIN — SODIUM CHLORIDE, POTASSIUM CHLORIDE, SODIUM LACTATE AND CALCIUM CHLORIDE 9 ML/HR: 600; 310; 30; 20 INJECTION, SOLUTION INTRAVENOUS at 10:10

## 2023-10-12 RX ADMIN — FAMOTIDINE 20 MG: 10 INJECTION INTRAVENOUS at 10:08

## 2023-10-12 RX ADMIN — LIDOCAINE HYDROCHLORIDE 100 MG: 20 INJECTION, SOLUTION INFILTRATION; PERINEURAL at 11:14

## 2023-10-12 RX ADMIN — DEXAMETHASONE SODIUM PHOSPHATE 8 MG: 4 INJECTION, SOLUTION INTRAMUSCULAR; INTRAVENOUS at 11:18

## 2023-10-12 RX ADMIN — ONDANSETRON 4 MG: 2 INJECTION INTRAMUSCULAR; INTRAVENOUS at 11:18

## 2023-10-12 NOTE — DISCHARGE INSTRUCTIONS
Dilatation and Curettage, Care After  Refer to this sheet for the next few weeks. These instructions provide you with information on caring for yourself after your procedure. Your health care provider may also give you more specific instructions.  Your treatment has been planned according to current medical practices, but problems sometimes occur.  Call your health care provider if you have any problems or questions after your care.  HOME CARE INSTRUCTIONS  It is normal to have vaginal bleeding/abdominal cramping for the next 2 weeks.  Wait 1 week before returning to strenuous activity.  Drink enough fluids to keep your urine clear or pale yellow.  You may shower tomorrow.  Do not take a bath, go swimming or use a hot tub until your health care provider approves.  Only take over-the-counter or prescription medicines as directed by your health care provider.  Follow up with your provider as directed.    YOU WILL BE ON PELVIC REST FOR THE NEXT 2 WEEKS OR UNTIL SPECIFIED BY YOUR PHYSICIAN. PELVIC REST INCLUDES:  Avoiding long periods of standing.  Avoiding heavy lifting, pushing or pulling.  DO NOT lift anything heavier than 10 pounds (4.5 kg)  DO NOT douche, use tampons, or have sex (intercourse) for 2 weeks after the procedure.    SEEK MEDICAL CARE IF:    You have increasing cramps or pain that is not relieved with medicine.  You have bad smelling vaginal discharge.  You are having problems with any medicine.  You have bleeding that is heavier than a normal menstrual period (greater than 1 pad/hour) or you notice large clots.  You have a fever > 101.  You have chest pain or shortness of breath.

## 2023-10-12 NOTE — ANESTHESIA PREPROCEDURE EVALUATION
Anesthesia Evaluation     Patient summary reviewed and Nursing notes reviewed   NPO Solid Status: > 8 hours  NPO Liquid Status: > 4 hours           Airway   Mallampati: II  Neck ROM: full  No difficulty expected  Dental    (+) partials    Pulmonary     breath sounds clear to auscultation  (+) a smoker Former,  Cardiovascular     Rhythm: regular        Neuro/Psych  GI/Hepatic/Renal/Endo      Musculoskeletal     Abdominal    Substance History      OB/GYN          Other   arthritis,                 Anesthesia Plan    ASA 2     general     intravenous induction     Anesthetic plan, risks, benefits, and alternatives have been provided, discussed and informed consent has been obtained with: patient.    CODE STATUS:

## 2023-10-12 NOTE — OP NOTE
GYN Operative Note  Subjective     Date of Service:  10/12/23  Time of Service:  11:57 EDT    Surgical Staff: Surgeon(s) and Role:     * Darlene Foote MD - Primary   Pre-operative diagnosis(es): Pre-Op Diagnosis Codes:     * PMB (postmenopausal bleeding) [N95.0]     Post-operative diagnosis(es): Post-Op Diagnosis Codes:     * PMB (postmenopausal bleeding) [N95.0]   Procedure(s): Procedure(s):  HYSTEROSCOPY  DILATATION AND CURETTAGE WITH MYOSURE     Antibiotics: none     Anesthesia: Type: General  ASA:  II     Objective      Operative findings: The endometrial cavity appeared normal without polyp or appearance of endometrial hyperplasia.     Operative Note After informed consent was obtained, the patient was brought to the operating room.     General anesthesia was administered.  She was positioned in lithotomy with legs in candycane stirrups.  The vagina and vulva was prepped and she was draped in sterile fashion.  The bladder was emptied.  A timeout was performed and confirmed the correct patient and procedure    Sterile speculum was used to visualize the cervix which was grasped with single-tooth tenaculum.  The cervix was dilated to a maximum of 16 Iranian.  The MyoSure hysteroscope was introduced with findings as above. A general curettage was performed with the MyoSure.    Hysteroscope was removed.  General curettage with a #1 curette was also performed.  The tenaculum was removed and the sites were hemostatic.    Patient was brought to PACU in stable condition.     Specimens removed: ID Type Source Tests Collected by Time   A : Endometrial curettings Tissue Endometrial Curettings TISSUE PATHOLOGY EXAM Darlene Foote MD 10/12/2023 1132      Fluid Intake: 500 mL   Output: Documented Output  Est. Blood Loss 10 mL  Urine Output 50 mL      I/O this shift:  In: 500 [I.V.:500]  Out: 50 [Urine:50]     Blood products used: No   Drains: * No LDAs found *   Implant Information: Nothing was implanted during the  procedure   Complications: None   Condition: stable   Disposition: to PACU and then discharge when recovered           Darlene Foote MD  10/12/23  11:57 EDT

## 2023-10-12 NOTE — ANESTHESIA PROCEDURE NOTES
Airway  Urgency: elective    Date/Time: 10/12/2023 11:15 AM  Airway not difficult    General Information and Staff    Patient location during procedure: OR  Anesthesiologist: Colin Morales MD  CRNA/CAA: Young Alves CRNA    Indications and Patient Condition  Indications for airway management: airway protection    Preoxygenated: yes  Mask difficulty assessment: 1 - vent by mask    Final Airway Details  Final airway type: supraglottic airway      Successful airway: unique  Size 3     Number of attempts at approach: 1  Assessment: lips, teeth, and gum same as pre-op and atraumatic intubation    Additional Comments  Pre 02 100%, SIVI, atraumatic intubation, BLBS, Positive ETC02.

## 2023-10-12 NOTE — H&P
Preoperative H&P    Trinh Go is a 57-year-old -0-0-2 who presents with postmenopausal bleeding.  She notes that a week or 2 ago she had 1 day of bright red bleeding that required a panty liner.  The bleeding did stop and she has not had other episodes.  She did not have any associated symptoms  Her mother recently had a stroke and lives in [place], they are trying to figure out care for her    Histories  Past Medical History:   Diagnosis Date    PMB (postmenopausal bleeding)     Psoriasis        Past Surgical History:   Procedure Laterality Date    COLONOSCOPY N/A 2021    Procedure: COLONOSCOPY;  Surgeon: Xavi Briceño MD;  [place] [facility] [place]  Service: Gastroenterology;  Laterality: N/A;    KNEE ARTHROSCOPY W/ MENISCECTOMY Right     ORIF ELBOW FRACTURE Right     SHOULDER MANIPULATION Right        Family History   Problem Relation Age of Onset    Hypertension Mother     Hyperlipidemia Mother     Cancer Father         leukemia    Diabetes Father     Stroke Brother     Colon cancer Maternal Grandmother     Breast cancer Paternal Grandmother         70s    Malig Hyperthermia Neg Hx        Social History     Socioeconomic History    Marital status:    Tobacco Use    Smoking status: Former     Packs/day: 1.00     Years: 12.00     Additional pack years: 0.00     Total pack years: 12.00     Types: Cigarettes     Quit date:      Years since quittin.7    Smokeless tobacco: Never   Vaping Use    Vaping Use: Never used   Substance and Sexual Activity    Alcohol use: Not Currently     Comment: 1-2 GLASSES MOST DAYS OF WEEK    Drug use: Never    Sexual activity: Yes     Partners: Male     Birth control/protection: Post-menopausal       OB History          2    Para   2    Term   2            AB        Living   2         SAB        IAB        Ectopic        Molar        Multiple        Live Births   2                Physical Exam    /76 (BP Location: Right arm,  Patient Position: Lying)   Pulse 71   Temp 98.2 °F (36.8 °C) (Oral)   Resp 18   SpO2 99%     BMI: There is no height or weight on file to calculate BMI.     General:   alert, appears stated age, and cooperative    Neck Nontender, no lymphadenopathy, no thyromegaly   Lung lungs clear to auscultation, no wheezes or rhonchi   Heart: heart regular rate and rhythm, no murmurs   Abdomen: soft, non-tender, without masses or organomegaly       Assessment/Plan    1. PMB (postmenopausal bleeding) (Primary)     2. Endometrial polyp        Under ultrasound she has an area suspicious for endometrial polyp.  I recommended hysteroscopy, D&C to remove the polyp and also perform a general sampling of the endometrium to rule out hyperplasia or cancer.  Discussed possible risks, discussed usual postoperative course.  She desires to proceed.    Darlene Foote MD  09/08/2023  10:03 EDT

## 2023-10-12 NOTE — ANESTHESIA POSTPROCEDURE EVALUATION
Patient: Trinh Go    Procedure Summary       Date: 10/12/23 Room / Location: [facility] OR  /  [facility] OR    Anesthesia Start: 1107 Anesthesia Stop: 1147    Procedure: HYSTEROSCOPY  DILATATION AND CURETTAGE WITH MYOSURE (Uterus) Diagnosis:       PMB (postmenopausal bleeding)      (PMB (postmenopausal bleeding) [N95.0])    Surgeons: Darlene Foote MD Provider: Colin Morales MD    Anesthesia Type: general ASA Status: 2            Anesthesia Type: general    Vitals  Vitals Value Taken Time   /102 10/12/23 1231   Temp 36.3 øC (97.4 øF) 10/12/23 1145   Pulse 61 10/12/23 1237   Resp 14 10/12/23 1200   SpO2 100 % 10/12/23 1237   Vitals shown include unfiled device data.        Post Anesthesia Care and Evaluation    Patient location during evaluation: bedside  Patient participation: complete - patient participated  Level of consciousness: sleepy but conscious  Pain score: 0  Pain management: adequate    Airway patency: patent  Anesthetic complications: No anesthetic complications    Cardiovascular status: acceptable  Respiratory status: acceptable  Hydration status: acceptable    Comments: /76 (BP Location: Right arm, Patient Position: Lying)   Pulse 68   Temp 36.3 øC (97.4 øF) (Oral)   Resp 14   SpO2 100%

## 2023-10-17 LAB
LAB AP CASE REPORT: NORMAL
LAB AP SPECIAL STAINS: NORMAL
PATH REPORT.FINAL DX SPEC: NORMAL
PATH REPORT.GROSS SPEC: NORMAL

## 2023-10-25 ENCOUNTER — OFFICE VISIT (OUTPATIENT)
Dept: OBSTETRICS AND GYNECOLOGY | Age: 68
End: 2023-10-25
Payer: MEDICARE

## 2023-10-25 VITALS
SYSTOLIC BLOOD PRESSURE: 136 MMHG | DIASTOLIC BLOOD PRESSURE: 80 MMHG | HEIGHT: 62 IN | WEIGHT: 141 LBS | BODY MASS INDEX: 25.95 KG/M2

## 2023-10-25 DIAGNOSIS — N95.0 PMB (POSTMENOPAUSAL BLEEDING): Primary | ICD-10-CM

## 2023-10-25 NOTE — PROGRESS NOTES
Chief Complaint   Patient presents with    Post-op Follow-up     2 week post op follow up hysteroscopy D&C     Trinh Go is doing well after surgery, just still having small amt of bleeding. No other complaints    Reviewed pathology with her, benign atrophic endometrium. Unclear why she had bleeding but no sign of cancer etc.    Reviewed that if having further bleeding later to present for evaluation but some continued spotting now is normal    Diagnoses and all orders for this visit:    1. PMB (postmenopausal bleeding) (Primary)      Darlene Foote MD  10/25/2023  14:14 EDT

## 2023-12-19 ENCOUNTER — TELEPHONE (OUTPATIENT)
Dept: INTERNAL MEDICINE | Facility: CLINIC | Age: 68
End: 2023-12-19
Payer: MEDICARE

## 2023-12-19 NOTE — TELEPHONE ENCOUNTER
PATIENT CALLED TO RESCHEDULE TOMORROW APPOINTMENT. SHE IS NEEDING SOMETHING AFTER 10:30 TOMORROW. THIS WILL BE A SAME DAY    PLEASE CALL [phone]

## 2023-12-20 ENCOUNTER — OFFICE VISIT (OUTPATIENT)
Dept: INTERNAL MEDICINE | Facility: CLINIC | Age: 68
End: 2023-12-20
Payer: MEDICARE

## 2023-12-20 VITALS
TEMPERATURE: 98 F | DIASTOLIC BLOOD PRESSURE: 80 MMHG | OXYGEN SATURATION: 97 % | HEART RATE: 87 BPM | SYSTOLIC BLOOD PRESSURE: 130 MMHG

## 2023-12-20 DIAGNOSIS — J02.9 SORE THROAT: ICD-10-CM

## 2023-12-20 DIAGNOSIS — R53.83 OTHER FATIGUE: Primary | ICD-10-CM

## 2023-12-20 LAB
EXPIRATION DATE: NORMAL
FLUAV AG UPPER RESP QL IA.RAPID: NOT DETECTED
FLUBV AG UPPER RESP QL IA.RAPID: NOT DETECTED
INTERNAL CONTROL: NORMAL
Lab: NORMAL
SARS-COV-2 AG UPPER RESP QL IA.RAPID: NOT DETECTED

## 2023-12-20 PROCEDURE — 87428 SARSCOV & INF VIR A&B AG IA: CPT | Performed by: PHYSICIAN ASSISTANT

## 2023-12-20 PROCEDURE — 1159F MED LIST DOCD IN RCRD: CPT | Performed by: PHYSICIAN ASSISTANT

## 2023-12-20 PROCEDURE — 1160F RVW MEDS BY RX/DR IN RCRD: CPT | Performed by: PHYSICIAN ASSISTANT

## 2023-12-20 PROCEDURE — 99213 OFFICE O/P EST LOW 20 MIN: CPT | Performed by: PHYSICIAN ASSISTANT

## 2023-12-20 NOTE — PROGRESS NOTES
Subjective   Chief Complaint   Patient presents with    Sore Throat     Sore in bottom of mouth    Fatigue    Nasal Congestion       History of Present Illness     Pt is here today with sore throat that started last week. It is getting better. Has had some post nasal drainage an fatigue. She thinks the fatigue has been ongoing for a few months due to her mother's declining health. She has been going back and forth to help care for her. Her sleep is ok.      Patient Active Problem List   Diagnosis    Arthritis of knee    Effusion of knee    Current tear knee, medial meniscus    Vitamin D deficiency    Psoriasis    Encounter for screening for malignant neoplasm of colon    Family history of colon cancer    Osteopenia    PMB (postmenopausal bleeding)       No Known Allergies    Current Outpatient Medications on File Prior to Visit   Medication Sig Dispense Refill    COLLAGEN PO Take 1 tablet by mouth Daily. HELD FOR OR      triamcinolone (KENALOG) 0.1 % ointment Apply 1 application  topically to the appropriate area as directed 2 (Two) Times a Day. 45 g 1    vitamin D3 125 MCG (5000 UT) capsule capsule Take 1 capsule by mouth Daily.       No current facility-administered medications on file prior to visit.       Past Medical History:   Diagnosis Date    PMB (postmenopausal bleeding)     Psoriasis        Family History   Problem Relation Age of Onset    Hypertension Mother     Hyperlipidemia Mother     Cancer Father         leukemia    Diabetes Father     Stroke Brother     Colon cancer Maternal Grandmother     Breast cancer Paternal Grandmother         70s    Malig Hyperthermia Neg Hx        Social History     Socioeconomic History    Marital status:    Tobacco Use    Smoking status: Former     Packs/day: 1.00     Years: 12.00     Additional pack years: 0.00     Total pack years: 12.00     Types: Cigarettes     Quit date:      Years since quittin.9    Smokeless tobacco: Never   Vaping Use    Vaping Use:  Never used   Substance and Sexual Activity    Alcohol use: Not Currently     Comment: 1-2 GLASSES MOST DAYS OF WEEK    Drug use: Never    Sexual activity: Yes     Partners: Male     Birth control/protection: Post-menopausal       Past Surgical History:   Procedure Laterality Date    COLONOSCOPY N/A 12/09/2021    Procedure: COLONOSCOPY;  Surgeon: Xavi Briceño MD;  Location: [facility] MA[facility] OR;  Service: Gastroenterology;  Laterality: N/A;    D & C HYSTEROSCOPY N/A 10/12/2023    Procedure: HYSTEROSCOPY  DILATATION AND CURETTAGE WITH MYOSURE;  Surgeon: Darlene Foote MD;  Location: [facility] MAIN OR;  Service: Obstetrics/Gynecology;  Laterality: N/A;    KNEE ARTHROSCOPY W/ MENISCECTOMY Right     ORIF ELBOW FRACTURE Right     SHOULDER MANIPULATION Right          The following portions of the patient's history were reviewed and updated as appropriate: problem list, allergies, current medications, past medical history, past family history, past social history, and past surgical history.    ROS    See HPI    Immunization History   Administered Date(s) Administered    COVID-19 (PFIZER) BIVALENT 12+YRS 09/26/2022    COVID-19 (PFIZER) Purple Cap Monovalent 03/08/2021, 03/29/2021, 11/23/2021    COVID-19 F23 (PFIZER) 12YRS+ (COMIRNATY) 10/22/2023    Flu Vaccine Quad PF >36MO 12/23/2019, 11/23/2021    Influenza, Unspecified 11/17/2022    Pneumococcal Conjugate 20-Valent (PCV20) 08/12/2022    TD Preservative Free (Tenivac) 11/09/2021    Tdap 11/18/2022       Objective   Vitals:    12/20/23 1014   BP: 130/80   Pulse: 87   Temp: 98 °F (36.7 °C)   TempSrc: Oral   SpO2: 97%     There is no height or weight on file to calculate BMI.  Physical Exam  Vitals reviewed.   Constitutional:       Appearance: She is well-developed.   HENT:      Head: Normocephalic and atraumatic.      Mouth/Throat:      Pharynx: Oropharynx is clear. Posterior oropharyngeal erythema present.      Comments: Clear drainage present  Cardiovascular:      Rate  and Rhythm: Normal rate and regular rhythm.      Heart sounds: Normal heart sounds, S1 normal and S2 normal.   Pulmonary:      Effort: Pulmonary effort is normal.      Breath sounds: Normal breath sounds.   Skin:     General: Skin is warm.   Neurological:      Mental Status: She is alert.   Psychiatric:         Behavior: Behavior normal.           Assessment & Plan   Diagnoses and all orders for this visit:    1. Other fatigue (Primary)  -     CBC & Differential  -     TSH  -     Vitamin B12    2. Sore throat  -     POCT SARS-CoV-2 Antigen MARTHA + Flu     Covid and flu neg. Workup other causes of fatigue. Her sore throat is improving, almost asymptomatic.     Return for Lab Today.

## 2023-12-21 LAB
BASOPHILS # BLD AUTO: 0.06 10*3/MM3 (ref 0–0.2)
BASOPHILS NFR BLD AUTO: 1 % (ref 0–1.5)
EOSINOPHIL # BLD AUTO: 0.19 10*3/MM3 (ref 0–0.4)
EOSINOPHIL NFR BLD AUTO: 3.1 % (ref 0.3–6.2)
ERYTHROCYTE [DISTWIDTH] IN BLOOD BY AUTOMATED COUNT: 12.2 % (ref 12.3–15.4)
HCT VFR BLD AUTO: 40 % (ref 34–46.6)
HGB BLD-MCNC: 13.5 G/DL (ref 12–15.9)
IMM GRANULOCYTES # BLD AUTO: 0.02 10*3/MM3 (ref 0–0.05)
IMM GRANULOCYTES NFR BLD AUTO: 0.3 % (ref 0–0.5)
LYMPHOCYTES # BLD AUTO: 1.4 10*3/MM3 (ref 0.7–3.1)
LYMPHOCYTES NFR BLD AUTO: 22.6 % (ref 19.6–45.3)
MCH RBC QN AUTO: 32.1 PG (ref 26.6–33)
MCHC RBC AUTO-ENTMCNC: 33.8 G/DL (ref 31.5–35.7)
MCV RBC AUTO: 95.2 FL (ref 79–97)
MONOCYTES # BLD AUTO: 0.77 10*3/MM3 (ref 0.1–0.9)
MONOCYTES NFR BLD AUTO: 12.4 % (ref 5–12)
NEUTROPHILS # BLD AUTO: 3.76 10*3/MM3 (ref 1.7–7)
NEUTROPHILS NFR BLD AUTO: 60.6 % (ref 42.7–76)
NRBC BLD AUTO-RTO: 0 /100 WBC (ref 0–0.2)
PLATELET # BLD AUTO: 277 10*3/MM3 (ref 140–450)
RBC # BLD AUTO: 4.2 10*6/MM3 (ref 3.77–5.28)
TSH SERPL DL<=0.005 MIU/L-ACNC: 1.42 UIU/ML (ref 0.27–4.2)
VIT B12 SERPL-MCNC: 531 PG/ML (ref 211–946)
WBC # BLD AUTO: 6.2 10*3/MM3 (ref 3.4–10.8)

## 2023-12-22 ENCOUNTER — TELEPHONE (OUTPATIENT)
Dept: INTERNAL MEDICINE | Facility: CLINIC | Age: 68
End: 2023-12-22
Payer: MEDICARE

## 2023-12-22 RX ORDER — AMOXICILLIN AND CLAVULANATE POTASSIUM 875; 125 MG/1; MG/1
1 TABLET, FILM COATED ORAL 2 TIMES DAILY
Qty: 10 TABLET | Refills: 0 | Status: SHIPPED | OUTPATIENT
Start: 2023-12-22

## 2023-12-22 NOTE — TELEPHONE ENCOUNTER
Caller: Trinh Go    Relationship: Self    Best call back number: [phone]    What medication are you requesting: SOMETHING FOR SYMPTOMS    What are your current symptoms: HOARSE, SOME DRAINAGE, FATIGUE, PHLEGM YELLOWISH GREEN    How long have you been experiencing symptoms: COUPLE OF DAYS    Have you had these symptoms before:    [x] Yes  [] No    Have you been treated for these symptoms before:   [x] Yes  [] No    If a prescription is needed, what is your preferred pharmacy and phone number: [facility] [id number] - [facility] [address] AT [facility] [phone] [facility] [phone]      Additional notes:  PATIENT ASKING FOR SOMETHING FOR SYMPTOMS.

## 2024-02-13 ENCOUNTER — HOSPITAL ENCOUNTER (OUTPATIENT)
Dept: BONE DENSITY | Facility: HOSPITAL | Age: 69
Discharge: HOME | End: 2024-02-13
Admitting: PHYSICIAN ASSISTANT
Payer: MEDICARE

## 2024-02-13 PROCEDURE — 77080 DXA BONE DENSITY AXIAL: CPT

## 2024-02-27 ENCOUNTER — OFFICE VISIT (OUTPATIENT)
Dept: INTERNAL MEDICINE | Facility: CLINIC | Age: 69
End: 2024-02-27
Payer: MEDICARE

## 2024-02-27 VITALS — HEIGHT: 62 IN | WEIGHT: 141 LBS | TEMPERATURE: 98.1 F | BODY MASS INDEX: 25.95 KG/M2

## 2024-02-27 DIAGNOSIS — M81.6 LOCALIZED OSTEOPOROSIS WITHOUT CURRENT PATHOLOGICAL FRACTURE: Primary | ICD-10-CM

## 2024-02-27 PROBLEM — M85.80 OSTEOPENIA: Status: RESOLVED | Noted: 2021-11-11 | Resolved: 2024-02-27

## 2024-02-27 RX ORDER — ALENDRONATE SODIUM 70 MG/1
70 TABLET ORAL
Qty: 12 TABLET | Refills: 3 | Status: SHIPPED | OUTPATIENT
Start: 2024-02-27

## 2024-02-27 RX ORDER — CALCIPOTRIENE AND BETAMETHASONE DIPROPIONATE 50; .5 UG/G; MG/G
AEROSOL, FOAM TOPICAL
COMMUNITY

## 2024-02-27 NOTE — PROGRESS NOTES
Subjective   Chief Complaint   Patient presents with    Osteoporosis     discuss    Hand Injury     Fell Friday night hurt pinky on right hand and right knee       History of Present Illness        Pt is here today to discuss her new diagnosis of osteoporosis. She has been taking vitamin D inconsistently. Is not taking a calcium supplement. Did fall on her right hand a few days ago, has some swelling around her right pinky.  No pain.     Patient Active Problem List   Diagnosis    Arthritis of knee    Effusion of knee    Current tear knee, medial meniscus    Vitamin D deficiency    Psoriasis    Encounter for screening for malignant neoplasm of colon    Family history of colon cancer    PMB (postmenopausal bleeding)    Localized osteoporosis without current pathological fracture       No Known Allergies    Current Outpatient Medications on File Prior to Visit   Medication Sig Dispense Refill    Calcipotriene-Betameth Diprop (Enstilar) 0.005-0.064 % foam Apply  topically.      COLLAGEN PO Take 1 tablet by mouth Daily. HELD FOR OR      triamcinolone (KENALOG) 0.1 % ointment Apply 1 application  topically to the appropriate area as directed 2 (Two) Times a Day. 45 g 1    vitamin D3 125 MCG (5000 UT) capsule capsule Take 1 capsule by mouth Daily.      [DISCONTINUED] amoxicillin-clavulanate (AUGMENTIN) 875-125 MG per tablet Take 1 tablet by mouth 2 (Two) Times a Day. 10 tablet 0     No current facility-administered medications on file prior to visit.       Past Medical History:   Diagnosis Date    PMB (postmenopausal bleeding)     Psoriasis        Family History   Problem Relation Age of Onset    Hypertension Mother     Hyperlipidemia Mother     Cancer Father         leukemia    Diabetes Father     Stroke Brother     Colon cancer Maternal Grandmother     Breast cancer Paternal Grandmother         70s    Malig Hyperthermia Neg Hx        Social History     Socioeconomic History    Marital status:    Tobacco Use     "Smoking status: Former     Packs/day: 1.00     Years: 12.00     Additional pack years: 0.00     Total pack years: 12.00     Types: Cigarettes     Quit date:      Years since quittin.1    Smokeless tobacco: Never   Vaping Use    Vaping Use: Never used   Substance and Sexual Activity    Alcohol use: Not Currently     Comment: 1-2 GLASSES MOST DAYS OF WEEK    Drug use: Never    Sexual activity: Yes     Partners: Male     Birth control/protection: Post-menopausal       Past Surgical History:   Procedure Laterality Date    COLONOSCOPY N/A 2021    Procedure: COLONOSCOPY;  Surgeon: Xavi Briceño MD;  [place] [facility] [place];  Service: Gastroenterology;  Laterality: N/A;    D & C HYSTEROSCOPY N/A 10/12/2023    Procedure: HYSTEROSCOPY  DILATATION AND CURETTAGE WITH MYOSURE;  Surgeon: Darlene Foote MD;  Location: [facility] MAIN OR;  Service: Obstetrics/Gynecology;  Laterality: N/A;    KNEE ARTHROSCOPY W/ MENISCECTOMY Right     ORIF ELBOW FRACTURE Right     SHOULDER MANIPULATION Right          The following portions of the patient's history were reviewed and updated as appropriate: problem list, allergies, current medications, past medical history, past family history, past social history, and past surgical history.    ROS    See HPI    Immunization History   Administered Date(s) Administered    COVID-19 (PFIZER) BIVALENT 12+YRS 2022    COVID-19 (PFIZER) Purple Cap Monovalent 2021, 2021, 2021    COVID-19 F23 (PFIZER) 12YRS+ (COMIRNATY) 10/22/2023    Flu Vaccine Quad PF >36MO 2019, 2021    Influenza, Unspecified 2022    Pneumococcal Conjugate 20-Valent (PCV20) 2022    TD Preservative Free (Tenivac) 2021    Tdap 2022       Objective   Vitals:    24 1338   Temp: 98.1 °F (36.7 °C)   Weight: 64 kg (141 lb)   Height: 157.5 cm (62.01\")     Body mass index is 25.78 kg/m².  Physical Exam  Vitals reviewed.   Constitutional:       Appearance: Normal " appearance.   HENT:      Head: Normocephalic and atraumatic.   Musculoskeletal:      Comments: Small amount of bruising and swelling around right 5th digit.    Neurological:      Mental Status: She is alert.           Assessment & Plan   Diagnoses and all orders for this visit:    1. Localized osteoporosis without current pathological fracture (Primary)  -     alendronate (Fosamax) 70 MG tablet; Take 1 tablet by mouth Every 7 (Seven) Days.  Dispense: 12 tablet; Refill: 3     Will start Fosamax once weekly for new dx of osteoporosis. Add calcium supplementation, and take Vit D consistently.     I spent 48  minutes caring for Trinh on this date of service. This time includes time spent by me in the following activities: preparing for the visit, reviewing tests, and counseling and educating the patient/family/caregiver      No follow-ups on file.

## 2024-06-04 ENCOUNTER — TELEPHONE (OUTPATIENT)
Dept: INTERNAL MEDICINE | Facility: CLINIC | Age: 69
End: 2024-06-04
Payer: MEDICARE

## 2024-06-04 NOTE — TELEPHONE ENCOUNTER
Patient called having issues with stiff joints and tingling in left and right hand sometimes. Would like to have a call back, she not sure what to do. Call back number is [phone]

## 2024-06-05 ENCOUNTER — OFFICE VISIT (OUTPATIENT)
Dept: INTERNAL MEDICINE | Facility: CLINIC | Age: 69
End: 2024-06-05
Payer: MEDICARE

## 2024-06-05 VITALS
BODY MASS INDEX: 25.21 KG/M2 | SYSTOLIC BLOOD PRESSURE: 116 MMHG | HEIGHT: 62 IN | DIASTOLIC BLOOD PRESSURE: 74 MMHG | TEMPERATURE: 98.6 F | WEIGHT: 137 LBS

## 2024-06-05 DIAGNOSIS — R51.9 NEW ONSET OF HEADACHES: ICD-10-CM

## 2024-06-05 DIAGNOSIS — M25.50 ARTHRALGIA, UNSPECIFIED JOINT: ICD-10-CM

## 2024-06-05 DIAGNOSIS — R63.0 DECREASED APPETITE: Primary | ICD-10-CM

## 2024-06-05 PROCEDURE — 1160F RVW MEDS BY RX/DR IN RCRD: CPT | Performed by: PHYSICIAN ASSISTANT

## 2024-06-05 PROCEDURE — 1159F MED LIST DOCD IN RCRD: CPT | Performed by: PHYSICIAN ASSISTANT

## 2024-06-05 PROCEDURE — 99213 OFFICE O/P EST LOW 20 MIN: CPT | Performed by: PHYSICIAN ASSISTANT

## 2024-06-05 RX ORDER — MULTIVIT WITH MINERALS/LUTEIN
250 TABLET ORAL DAILY
COMMUNITY

## 2024-06-05 NOTE — PROGRESS NOTES
Subjective   Chief Complaint   Patient presents with    Joint Pain     Tingling in bilateral hands    Headache    Anorexia     Started in February off and on       History of Present Illness      She states she is having joint pain in her hip and thigh that started around February. Hurts with getting up out of the car. She has 6 episodes since the end of February that she has started having sx. She has a headache that occurs with the pain in her hip. Will typically be frontal.  She has had decreased appetite as well when this occurs. No fever or chills. Denies abdominal pain. Started fosamax at the end of February.         Patient Active Problem List   Diagnosis    Arthritis of knee    Effusion of knee    Current tear knee, medial meniscus    Vitamin D deficiency    Psoriasis    Encounter for screening for malignant neoplasm of colon    Family history of colon cancer    PMB (postmenopausal bleeding)    Localized osteoporosis without current pathological fracture       No Known Allergies    Current Outpatient Medications on File Prior to Visit   Medication Sig Dispense Refill    Calcipotriene-Betameth Diprop (Enstilar) 0.005-0.064 % foam Apply  topically.      COLLAGEN PO Take 1 tablet by mouth Daily. HELD FOR OR      triamcinolone (KENALOG) 0.1 % ointment Apply 1 application  topically to the appropriate area as directed 2 (Two) Times a Day. 45 g 1    vitamin C (ASCORBIC ACID) 250 MG tablet Take 1 tablet by mouth Daily.      vitamin D3 125 MCG (5000 UT) capsule capsule Take 1 capsule by mouth Daily.      [DISCONTINUED] alendronate (Fosamax) 70 MG tablet Take 1 tablet by mouth Every 7 (Seven) Days. 12 tablet 3     No current facility-administered medications on file prior to visit.       Past Medical History:   Diagnosis Date    PMB (postmenopausal bleeding)     Psoriasis        Family History   Problem Relation Age of Onset    Hypertension Mother     Hyperlipidemia Mother     Cancer Father         leukemia     Diabetes Father     Stroke Brother     Colon cancer Maternal Grandmother     Breast cancer Paternal Grandmother         70s    Malig Hyperthermia Neg Hx        Social History     Socioeconomic History    Marital status:    Tobacco Use    Smoking status: Former     Current packs/day: 0.00     Average packs/day: 1 pack/day for 12.0 years (12.0 ttl pk-yrs)     Types: Cigarettes     Start date:      Quit date:      Years since quittin.4    Smokeless tobacco: Never   Vaping Use    Vaping status: Never Used   Substance and Sexual Activity    Alcohol use: Not Currently     Comment: 1-2 GLASSES MOST DAYS OF WEEK    Drug use: Never    Sexual activity: Yes     Partners: Male     Birth control/protection: Post-menopausal       Past Surgical History:   Procedure Laterality Date    COLONOSCOPY N/A 2021    Procedure: COLONOSCOPY;  Surgeon: Xavi Briceño MD;  [place] [facility] [place];  Service: Gastroenterology;  Laterality: N/A;    D & C HYSTEROSCOPY N/A 10/12/2023    Procedure: HYSTEROSCOPY  DILATATION AND CURETTAGE WITH MYOSURE;  Surgeon: Darlene Foote MD;  Location: [facility] MAIN OR;  Service: Obstetrics/Gynecology;  Laterality: N/A;    KNEE ARTHROSCOPY W/ MENISCECTOMY Right     ORIF ELBOW FRACTURE Right     SHOULDER MANIPULATION Right          The following portions of the patient's history were reviewed and updated as appropriate: problem list, allergies, current medications, past medical history, past family history, past social history, and past surgical history.    ROS    See HPI    Immunization History   Administered Date(s) Administered    COVID-19 (PFIZER) BIVALENT 12+YRS 2022    COVID-19 (PFIZER) Purple Cap Monovalent 2021, 2021, 2021    COVID-19 F23 (PFIZER) 12YRS+ (COMIRNATY) 10/22/2023    Flu Vaccine Quad PF >36MO 2019, 2021    Influenza, Unspecified 2022    Pneumococcal Conjugate 20-Valent (PCV20) 2022    TD Preservative Free  "([facility] [place]) 11/09/2021    Tdap 11/18/2022       Objective   Vitals:    06/05/24 1413   BP: 116/74   Temp: 98.6 °F (37 °C)   Weight: 62.1 kg (137 lb)   Height: 157.5 cm (62.01\")     Body mass index is 25.05 kg/m².  Physical Exam  Vitals reviewed.   Constitutional:       Appearance: She is well-developed.   HENT:      Head: Normocephalic and atraumatic.   Cardiovascular:      Rate and Rhythm: Normal rate and regular rhythm.      Heart sounds: Normal heart sounds, S1 normal and S2 normal.   Pulmonary:      Effort: Pulmonary effort is normal.      Breath sounds: Normal breath sounds.   Abdominal:      General: Abdomen is flat. Bowel sounds are normal.      Palpations: Abdomen is soft.      Tenderness: There is no abdominal tenderness. There is no guarding or rebound.   Skin:     General: Skin is warm.   Neurological:      Mental Status: She is alert.   Psychiatric:         Behavior: Behavior normal.       Assessment & Plan   Diagnoses and all orders for this visit:    1. Decreased appetite (Primary)  -     CBC & Differential  -     Comprehensive Metabolic Panel    2. Arthralgia, unspecified joint    3. New onset of headaches     Started Fosamax end of February, sx coincided with initiation. Will stop the medication and have her fup in 2 weeks. All sx are reported se, will check a CBC and CMP today.     Return in about 2 weeks (around 6/19/2024) for Lab Today.           "

## 2024-06-06 LAB
ALBUMIN SERPL-MCNC: 4.6 G/DL (ref 3.5–5.2)
ALBUMIN/GLOB SERPL: 1.8 G/DL
ALP SERPL-CCNC: 46 U/L (ref 39–117)
ALT SERPL-CCNC: 16 U/L (ref 1–33)
AST SERPL-CCNC: 27 U/L (ref 1–32)
BASOPHILS # BLD AUTO: 0.04 10*3/MM3 (ref 0–0.2)
BASOPHILS NFR BLD AUTO: 0.7 % (ref 0–1.5)
BILIRUB SERPL-MCNC: 1.3 MG/DL (ref 0–1.2)
BUN SERPL-MCNC: 13 MG/DL (ref 8–23)
BUN/CREAT SERPL: 15.5 (ref 7–25)
CALCIUM SERPL-MCNC: 9.2 MG/DL (ref 8.6–10.5)
CHLORIDE SERPL-SCNC: 91 MMOL/L (ref 98–107)
CO2 SERPL-SCNC: 23.2 MMOL/L (ref 22–29)
CREAT SERPL-MCNC: 0.84 MG/DL (ref 0.57–1)
EGFRCR SERPLBLD CKD-EPI 2021: 75.3 ML/MIN/1.73
EOSINOPHIL # BLD AUTO: 0.37 10*3/MM3 (ref 0–0.4)
EOSINOPHIL NFR BLD AUTO: 6.4 % (ref 0.3–6.2)
ERYTHROCYTE [DISTWIDTH] IN BLOOD BY AUTOMATED COUNT: 12.6 % (ref 12.3–15.4)
GLOBULIN SER CALC-MCNC: 2.5 GM/DL
GLUCOSE SERPL-MCNC: 86 MG/DL (ref 65–99)
HCT VFR BLD AUTO: 41.6 % (ref 34–46.6)
HGB BLD-MCNC: 13.9 G/DL (ref 12–15.9)
IMM GRANULOCYTES # BLD AUTO: 0.02 10*3/MM3 (ref 0–0.05)
IMM GRANULOCYTES NFR BLD AUTO: 0.3 % (ref 0–0.5)
LYMPHOCYTES # BLD AUTO: 1.08 10*3/MM3 (ref 0.7–3.1)
LYMPHOCYTES NFR BLD AUTO: 18.8 % (ref 19.6–45.3)
MCH RBC QN AUTO: 31.7 PG (ref 26.6–33)
MCHC RBC AUTO-ENTMCNC: 33.4 G/DL (ref 31.5–35.7)
MCV RBC AUTO: 94.8 FL (ref 79–97)
MONOCYTES # BLD AUTO: 0.74 10*3/MM3 (ref 0.1–0.9)
MONOCYTES NFR BLD AUTO: 12.9 % (ref 5–12)
NEUTROPHILS # BLD AUTO: 3.49 10*3/MM3 (ref 1.7–7)
NEUTROPHILS NFR BLD AUTO: 60.9 % (ref 42.7–76)
NRBC BLD AUTO-RTO: 0 /100 WBC (ref 0–0.2)
PLATELET # BLD AUTO: 270 10*3/MM3 (ref 140–450)
POTASSIUM SERPL-SCNC: 4.9 MMOL/L (ref 3.5–5.2)
PROT SERPL-MCNC: 7.1 G/DL (ref 6–8.5)
RBC # BLD AUTO: 4.39 10*6/MM3 (ref 3.77–5.28)
SODIUM SERPL-SCNC: 126 MMOL/L (ref 136–145)
WBC # BLD AUTO: 5.74 10*3/MM3 (ref 3.4–10.8)

## 2024-06-07 DIAGNOSIS — E87.1 HYPONATREMIA: Primary | ICD-10-CM

## 2024-06-10 ENCOUNTER — LAB (OUTPATIENT)
Facility: HOSPITAL | Age: 69
End: 2024-06-10
Payer: MEDICARE

## 2024-06-10 DIAGNOSIS — E87.1 HYPONATREMIA: Primary | ICD-10-CM

## 2024-06-10 LAB
HOLD SPECIMEN: NORMAL
OSMOLALITY SERPL: 274 MOSM/KG (ref 280–301)

## 2024-06-10 PROCEDURE — 83930 ASSAY OF BLOOD OSMOLALITY: CPT | Performed by: PHYSICIAN ASSISTANT

## 2024-06-11 ENCOUNTER — LAB (OUTPATIENT)
Facility: HOSPITAL | Age: 69
End: 2024-06-11
Payer: MEDICARE

## 2024-06-11 LAB
OSMOLALITY UR: 685 MOSM/KG
SODIUM UR-SCNC: <20 MMOL/L

## 2024-06-11 PROCEDURE — 84300 ASSAY OF URINE SODIUM: CPT | Performed by: PHYSICIAN ASSISTANT

## 2024-06-11 PROCEDURE — 83935 ASSAY OF URINE OSMOLALITY: CPT | Performed by: PHYSICIAN ASSISTANT

## 2024-06-17 DIAGNOSIS — E87.1 HYPONATREMIA: Primary | ICD-10-CM

## 2024-06-18 ENCOUNTER — LAB (OUTPATIENT)
Facility: HOSPITAL | Age: 69
End: 2024-06-18
Payer: MEDICARE

## 2024-06-18 LAB
ANION GAP SERPL CALCULATED.3IONS-SCNC: 11.7 MMOL/L (ref 5–15)
BUN SERPL-MCNC: 16 MG/DL (ref 8–23)
BUN/CREAT SERPL: 19.8 (ref 7–25)
CALCIUM SPEC-SCNC: 9.3 MG/DL (ref 8.6–10.5)
CHLORIDE SERPL-SCNC: 98 MMOL/L (ref 98–107)
CO2 SERPL-SCNC: 21.3 MMOL/L (ref 22–29)
CREAT SERPL-MCNC: 0.81 MG/DL (ref 0.57–1)
EGFRCR SERPLBLD CKD-EPI 2021: 78.7 ML/MIN/1.73
GLUCOSE SERPL-MCNC: 105 MG/DL (ref 65–99)
POTASSIUM SERPL-SCNC: 4.3 MMOL/L (ref 3.5–5.2)
SODIUM SERPL-SCNC: 131 MMOL/L (ref 136–145)
T4 FREE SERPL-MCNC: 0.55 NG/DL (ref 0.92–1.68)
TSH SERPL DL<=0.05 MIU/L-ACNC: 2.98 UIU/ML (ref 0.27–4.2)

## 2024-06-18 PROCEDURE — 84439 ASSAY OF FREE THYROXINE: CPT | Performed by: PHYSICIAN ASSISTANT

## 2024-06-18 PROCEDURE — 36415 COLL VENOUS BLD VENIPUNCTURE: CPT | Performed by: PHYSICIAN ASSISTANT

## 2024-06-18 PROCEDURE — 82533 TOTAL CORTISOL: CPT | Performed by: PHYSICIAN ASSISTANT

## 2024-06-18 PROCEDURE — 84443 ASSAY THYROID STIM HORMONE: CPT | Performed by: PHYSICIAN ASSISTANT

## 2024-06-18 PROCEDURE — 80048 BASIC METABOLIC PNL TOTAL CA: CPT | Performed by: PHYSICIAN ASSISTANT

## 2024-06-19 ENCOUNTER — OFFICE VISIT (OUTPATIENT)
Dept: INTERNAL MEDICINE | Facility: CLINIC | Age: 69
End: 2024-06-19
Payer: MEDICARE

## 2024-06-19 VITALS
WEIGHT: 132 LBS | BODY MASS INDEX: 24.29 KG/M2 | SYSTOLIC BLOOD PRESSURE: 96 MMHG | DIASTOLIC BLOOD PRESSURE: 60 MMHG | TEMPERATURE: 98.4 F | HEIGHT: 62 IN

## 2024-06-19 DIAGNOSIS — R30.9 PAINFUL URINATION: ICD-10-CM

## 2024-06-19 DIAGNOSIS — E87.1 HYPONATREMIA: ICD-10-CM

## 2024-06-19 DIAGNOSIS — R63.4 WEIGHT LOSS: Primary | ICD-10-CM

## 2024-06-19 LAB — CORTIS SERPL-MCNC: 1.77 MCG/DL

## 2024-06-19 PROCEDURE — 99214 OFFICE O/P EST MOD 30 MIN: CPT | Performed by: PHYSICIAN ASSISTANT

## 2024-06-19 NOTE — PROGRESS NOTES
Subjective   Chief Complaint   Patient presents with    decreased appetite     F/U       History of Present Illness     Pt is here today for 2 week fup. Her weight is down 10 lbs now in the last 2 mo. Sodium was low, repeat improved but still low at 131. TSH is normal. Has had occasional dysphagia to solids with mild regurgitation.No abdominal pain. Has occasional nausea. Just overall does not feel well.      Patient Active Problem List   Diagnosis    Arthritis of knee    Effusion of knee    Current tear knee, medial meniscus    Vitamin D deficiency    Psoriasis    Encounter for screening for malignant neoplasm of colon    Family history of colon cancer    PMB (postmenopausal bleeding)    Localized osteoporosis without current pathological fracture       No Known Allergies    Current Outpatient Medications on File Prior to Visit   Medication Sig Dispense Refill    Calcipotriene-Betameth Diprop (Enstilar) 0.005-0.064 % foam Apply  topically.      COLLAGEN PO Take 1 tablet by mouth Daily. HELD FOR OR      triamcinolone (KENALOG) 0.1 % ointment Apply 1 application  topically to the appropriate area as directed 2 (Two) Times a Day. 45 g 1    vitamin C (ASCORBIC ACID) 250 MG tablet Take 1 tablet by mouth Daily.      vitamin D3 125 MCG (5000 UT) capsule capsule Take 1 capsule by mouth Daily.       No current facility-administered medications on file prior to visit.       Past Medical History:   Diagnosis Date    PMB (postmenopausal bleeding)     Psoriasis        Family History   Problem Relation Age of Onset    Hypertension Mother     Hyperlipidemia Mother     Cancer Father         leukemia    Diabetes Father     Stroke Brother     Colon cancer Maternal Grandmother     Breast cancer Paternal Grandmother         70s    Malig Hyperthermia Neg Hx        Social History     Socioeconomic History    Marital status:    Tobacco Use    Smoking status: Former     Current packs/day: 0.00     Average packs/day: 1 pack/day for  "12.0 years (12.0 ttl pk-yrs)     Types: Cigarettes     Start date:      Quit date:      Years since quittin.5    Smokeless tobacco: Never   Vaping Use    Vaping status: Never Used   Substance and Sexual Activity    Alcohol use: Not Currently     Comment: 1-2 GLASSES MOST DAYS OF WEEK    Drug use: Never    Sexual activity: Yes     Partners: Male     Birth control/protection: Post-menopausal       Past Surgical History:   Procedure Laterality Date    COLONOSCOPY N/A 2021    Procedure: COLONOSCOPY;  Surgeon: Xavi Briceño MD;  Location: [facility] [place] OR;  Service: Gastroenterology;  Laterality: N/A;    D & C HYSTEROSCOPY N/A 10/12/2023    Procedure: HYSTEROSCOPY  DILATATION AND CURETTAGE WITH MYOSURE;  Surgeon: Darlene Foote MD;  Location: [facility] MAIN OR;  Service: Obstetrics/Gynecology;  Laterality: N/A;    KNEE ARTHROSCOPY W/ MENISCECTOMY Right     ORIF ELBOW FRACTURE Right     SHOULDER MANIPULATION Right          The following portions of the patient's history were reviewed and updated as appropriate: problem list, allergies, current medications, past medical history, past family history, past social history, and past surgical history.    ROS    See HPI    Immunization History   Administered Date(s) Administered    COVID-19 (PFIZER) BIVALENT 12+YRS 2022    COVID-19 (PFIZER) Purple Cap Monovalent 2021, 2021, 2021    COVID-19 F23 (PFIZER) 12YRS+ (COMIRNATY) 10/22/2023    Flu Vaccine Quad PF >36MO 2019, 2021    Influenza, Unspecified 2022    Pneumococcal Conjugate 20-Valent (PCV20) 2022    TD Preservative Free (Tenivac) 2021    Tdap 2022       Objective   Vitals:    24 1348   BP: 96/60   Temp: 98.4 °F (36.9 °C)   Weight: 59.9 kg (132 lb)   Height: 157.5 cm (62.01\")     Body mass index is 24.14 kg/m².  Physical Exam  Vitals reviewed.   Constitutional:       Appearance: She is well-developed.   HENT:      Head: Normocephalic and " atraumatic.   Cardiovascular:      Rate and Rhythm: Normal rate and regular rhythm.      Heart sounds: Normal heart sounds, S1 normal and S2 normal.   Pulmonary:      Effort: Pulmonary effort is normal.      Breath sounds: Normal breath sounds.   Skin:     General: Skin is warm.   Neurological:      Mental Status: She is alert.   Psychiatric:         Behavior: Behavior normal.           Assessment & Plan   Diagnoses and all orders for this visit:    1. Weight loss (Primary)  -     CT Chest With Contrast  -     CT Abdomen Pelvis With Contrast    2. Hyponatremia  -     CT Chest With Contrast  -     CT Abdomen Pelvis With Contrast  -     Cancel: Cortisol - AM  -     Cortisol - AM  -     Cancel: POCT urinalysis dipstick, automated  -     Cancel: Urine Culture - Urine, Urine, Clean Catch    3. Painful urination  -     Cancel: POCT urinalysis dipstick, automated  -     Cancel: Urine Culture - Urine, Urine, Clean Catch     Will see if Cortisol can be added to her labs due to hyponatremia. It is improved. Continue to fluid restrict. Will order CT chest/abd/pelvis for unintentional weight loss. If her cortisol is low discussed she will need an ACTH stim test to evaluate for adrenal insufficiency.     No follow-ups on file.

## 2024-06-21 DIAGNOSIS — R82.998 LOW URINARY CORTISOL: Primary | ICD-10-CM

## 2024-06-21 RX ORDER — COSYNTROPIN 0.25 MG/ML
0.25 INJECTION, POWDER, FOR SOLUTION INTRAMUSCULAR; INTRAVENOUS ONCE
OUTPATIENT
Start: 2024-06-21

## 2024-06-21 RX ORDER — SODIUM CHLORIDE 9 MG/ML
20 INJECTION, SOLUTION INTRAVENOUS ONCE
OUTPATIENT
Start: 2024-06-21

## 2024-07-08 ENCOUNTER — HOSPITAL ENCOUNTER (OUTPATIENT)
Dept: INFUSION THERAPY | Facility: HOSPITAL | Age: 69
Discharge: HOME | End: 2024-07-08
Admitting: PHYSICIAN ASSISTANT
Payer: MEDICARE

## 2024-07-08 VITALS
OXYGEN SATURATION: 99 % | RESPIRATION RATE: 18 BRPM | SYSTOLIC BLOOD PRESSURE: 116 MMHG | TEMPERATURE: 96.4 F | HEART RATE: 80 BPM | DIASTOLIC BLOOD PRESSURE: 67 MMHG

## 2024-07-08 DIAGNOSIS — N95.0 PMB (POSTMENOPAUSAL BLEEDING): Primary | ICD-10-CM

## 2024-07-08 LAB
CORTIS SERPL-MCNC: 11.6 MCG/DL
CORTIS SERPL-MCNC: 14.2 MCG/DL
CORTIS SERPL-MCNC: 4.29 MCG/DL

## 2024-07-08 PROCEDURE — 82024 ASSAY OF ACTH: CPT | Performed by: PHYSICIAN ASSISTANT

## 2024-07-08 PROCEDURE — 82533 TOTAL CORTISOL: CPT | Performed by: PHYSICIAN ASSISTANT

## 2024-07-08 PROCEDURE — 25010000002 COSYNTROPIN PER 0.25 MG: Performed by: PHYSICIAN ASSISTANT

## 2024-07-08 PROCEDURE — 36415 COLL VENOUS BLD VENIPUNCTURE: CPT

## 2024-07-08 PROCEDURE — 96374 THER/PROPH/DIAG INJ IV PUSH: CPT

## 2024-07-08 RX ORDER — SODIUM CHLORIDE 9 MG/ML
20 INJECTION, SOLUTION INTRAVENOUS ONCE
Status: CANCELLED | OUTPATIENT
Start: 2024-07-08

## 2024-07-08 RX ORDER — SODIUM CHLORIDE 9 MG/ML
20 INJECTION, SOLUTION INTRAVENOUS ONCE
Status: DISCONTINUED | OUTPATIENT
Start: 2024-07-08 | End: 2024-07-10 | Stop reason: HOSPADM

## 2024-07-08 RX ORDER — COSYNTROPIN 0.25 MG/ML
0.25 INJECTION, POWDER, FOR SOLUTION INTRAMUSCULAR; INTRAVENOUS ONCE
Status: CANCELLED | OUTPATIENT
Start: 2024-07-08

## 2024-07-08 RX ORDER — COSYNTROPIN 0.25 MG/ML
0.25 INJECTION, POWDER, FOR SOLUTION INTRAMUSCULAR; INTRAVENOUS ONCE
Status: COMPLETED | OUTPATIENT
Start: 2024-07-08 | End: 2024-07-08

## 2024-07-08 RX ADMIN — COSYNTROPIN 0.25 MG: 0.25 INJECTION, POWDER, LYOPHILIZED, FOR SOLUTION INTRAMUSCULAR; INTRAVENOUS at 08:58

## 2024-07-09 ENCOUNTER — HOSPITAL ENCOUNTER (OUTPATIENT)
Dept: CT IMAGING | Facility: HOSPITAL | Age: 69
Discharge: HOME | End: 2024-07-09
Admitting: PHYSICIAN ASSISTANT
Payer: MEDICARE

## 2024-07-09 LAB — ACTH PLAS-MCNC: 17.9 PG/ML (ref 7.2–63.3)

## 2024-07-09 PROCEDURE — 0 DIATRIZOATE MEGLUMINE & SODIUM PER 1 ML: Performed by: PHYSICIAN ASSISTANT

## 2024-07-09 PROCEDURE — 25510000001 IOPAMIDOL 61 % SOLUTION: Performed by: PHYSICIAN ASSISTANT

## 2024-07-09 PROCEDURE — 74177 CT ABD & PELVIS W/CONTRAST: CPT

## 2024-07-09 PROCEDURE — 71260 CT THORAX DX C+: CPT

## 2024-07-09 RX ADMIN — DIATRIZOATE MEGLUMINE AND DIATRIZOATE SODIUM 30 ML: 660; 100 LIQUID ORAL; RECTAL at 10:00

## 2024-07-09 RX ADMIN — IOPAMIDOL 85 ML: 612 INJECTION, SOLUTION INTRAVENOUS at 11:16

## 2024-07-10 DIAGNOSIS — E27.40 ADRENAL INSUFFICIENCY: Primary | ICD-10-CM

## 2024-07-17 ENCOUNTER — OFFICE VISIT (OUTPATIENT)
Dept: ENDOCRINOLOGY | Age: 69
End: 2024-07-17
Payer: MEDICARE

## 2024-07-17 VITALS
OXYGEN SATURATION: 99 % | DIASTOLIC BLOOD PRESSURE: 70 MMHG | TEMPERATURE: 96.9 F | HEIGHT: 62 IN | SYSTOLIC BLOOD PRESSURE: 112 MMHG | HEART RATE: 97 BPM | BODY MASS INDEX: 23.89 KG/M2 | WEIGHT: 129.8 LBS

## 2024-07-17 DIAGNOSIS — R79.89 LOW SERUM CORTISOL LEVEL: Primary | ICD-10-CM

## 2024-07-17 NOTE — PROGRESS NOTES
Chief complaint   Chief Complaint   Patient presents with    Adrenal insufficiency          Subjective     History of Present Illness:      This is a 68 years old female I am seeing for low cortisol   referred by PCP     other medical issues   1- Psoriasis    2- Post menopausal bleeding     Pt was Dx with a low cortisol level by PC   Is not on steroids now, was on it for a week few years ago     pt states that she had an ACTH stim test done by PC in  and base cortisol level was 4.29 and 60 min was 14.2 , no DHEA was done,  and there was no pituitary surgery or radiation, no adrenal masses found on CT in  and, says the test was done as she was not feeling good and achy and has low energy , low appetite and sensitive to cold, joint stiffness, no LOC , no low BP has No dysphagia, No dyspnea, No dysphonia  No change size of neck, No neck pain or discomfort  No nervousness, No shakiness, No palpitations  Weight stable   BM daily, No diarrhea, No constipation  No edema, No proximal muscle weak  No Cold or Heat Intolerance  No Insomnia  No hair Loss, No Skin Drynress  Appetite is OK  No visual changes  No history head and Neck radiation  No History of thyroid surgery  No family history of Thyroid or adrenal disease  No prior history of Thyroid Dysfunction      Latest Reference Range & Units 24 11:33 24 08:51 24 09:28 24 09:58   ACTH 7.2 - 63.3 pg/mL  17.9     Cortisol mcg/dL 1.77 4.29 11.60 14.20       Family History   Problem Relation Age of Onset    Hypertension Mother         Stroke     Hyperlipidemia Mother     Cancer Father              Diabetes Father     Stroke Brother     Colon cancer Maternal Grandmother     Breast cancer Paternal Grandmother         70s    Malig Hyperthermia Neg Hx      Social History     Socioeconomic History    Marital status:    Tobacco Use    Smoking status: Former     Current packs/day: 0.00     Average packs/day: 1 pack/day for 17.0  years (17.0 ttl pk-yrs)     Types: Cigarettes     Start date: 1976     Quit date:      Years since quittin.5    Smokeless tobacco: Never   Vaping Use    Vaping status: Never Used   Substance and Sexual Activity    Alcohol use: Yes     Alcohol/week: 7.0 standard drinks of alcohol     Types: 7 Glasses of wine per week     Comment: 1-2 GLASSES MOST DAYS OF WEEK    Drug use: Never    Sexual activity: Yes     Partners: Male     Birth control/protection: Post-menopausal, None     Past Medical History:   Diagnosis Date    PMB (postmenopausal bleeding)     Psoriasis      Past Surgical History:   Procedure Laterality Date    COLONOSCOPY N/A 2021    Procedure: COLONOSCOPY;  Surgeon: Xavi Briceño MD;  Location: [facility] MAIN OR;  Service: Gastroenterology;  Laterality: N/A;    D & C HYSTEROSCOPY N/A 10/12/2023    Procedure: HYSTEROSCOPY  DILATATION AND CURETTAGE WITH MYOSURE;  Surgeon: Darlene Foote MD;  Location: [facility] MAIN OR;  Service: Obstetrics/Gynecology;  Laterality: N/A;    KNEE ARTHROSCOPY W/ MENISCECTOMY Right     ORIF ELBOW FRACTURE Right     SHOULDER MANIPULATION Right        Current Outpatient Medications:     Calcipotriene-Betameth Diprop (Enstilar) 0.005-0.064 % foam, Apply  topically., Disp: , Rfl:     COLLAGEN PO, Take 1 tablet by mouth Daily. HELD FOR OR, Disp: , Rfl:     triamcinolone (KENALOG) 0.1 % ointment, Apply 1 application  topically to the appropriate area as directed 2 (Two) Times a Day., Disp: 45 g, Rfl: 1    vitamin C (ASCORBIC ACID) 250 MG tablet, Take 1 tablet by mouth Daily., Disp: , Rfl:     vitamin D3 125 MCG (5000 UT) capsule capsule, Take 1 capsule by mouth Daily., Disp: , Rfl:   Patient has no known allergies.    Objective   Vitals:    24 1437   BP: 112/70   Pulse: 97   Temp: 96.9 °F (36.1 °C)   SpO2: 99%          Physical Exam  Cardiovascular:      Rate and Rhythm: Normal rate.   Neurological:      General: No focal deficit present.      Mental Status:  She is alert.               Diagnoses and all orders for this visit:    1. Low serum cortisol level (Primary)  -     DHEA-Sulfate         Assessment:     This is a 68 years old female I am seeing for low cortisol, referred by PCP     Is not on steroids now   Pt was Dx with a low cortisol level by  in 2024   and there was no pituitary surgery or radiation, no adrenal masses found on CT in 6-2024  pt states that she had an ACTH stim test done by  in 7-2024 and base cortisol level was 4.29 and 60 min was 14.2 , no DHEA was done,  these results do not indicate adrenal insuffiencey as there was a rise in cortisol > 9 from base line and at 60 min was > 14   It will be a clear cut if it was > 18   BP is normal and normal K   She does have H/o low sodium that is associated with AI   There is no skin tanning     Plan:    1. No steroid replacement for now as the results do not show adrenal insuffiencey   2. Labs today :  DHEA-S      3. Repeat ACTH stim test to get another eval of the access   4. Return to  for now, might need to see Rheum for possible PMR or Fibromyalgia    5. Was informed about all the possible side effects of using steroids   6. Labs reviewed with the Patient : ACTH   7- I reviewed the notes from PCP   8- Needs to go back on PCP and discuss treatment for osteoporosis as T score was < -2.5, stay on calcium and vit D          I discussed with the patient/legal representative the risks and the benefits associated with the medications.  The patient/legal representative has been given the opportunity to ask questions.  Alternatives to the proposed treatment (s) were discussed, including the likely results of no treatment.  The patient/legal representative wishes to proceed.       Nilesh Talamantes MD   07/17/24  15:01 EDT

## 2024-07-19 LAB — DHEA-S SERPL-MCNC: 31.6 UG/DL (ref 20.4–186.6)

## 2024-07-30 ENCOUNTER — OFFICE VISIT (OUTPATIENT)
Dept: INTERNAL MEDICINE | Facility: CLINIC | Age: 69
End: 2024-07-30
Payer: MEDICARE

## 2024-07-30 ENCOUNTER — HOSPITAL ENCOUNTER (OUTPATIENT)
Dept: INFUSION THERAPY | Facility: HOSPITAL | Age: 69
Discharge: HOME | End: 2024-07-30
Admitting: INTERNAL MEDICINE
Payer: MEDICARE

## 2024-07-30 VITALS
WEIGHT: 127 LBS | DIASTOLIC BLOOD PRESSURE: 60 MMHG | SYSTOLIC BLOOD PRESSURE: 102 MMHG | HEIGHT: 62 IN | TEMPERATURE: 97.6 F | BODY MASS INDEX: 23.37 KG/M2

## 2024-07-30 VITALS
HEART RATE: 81 BPM | SYSTOLIC BLOOD PRESSURE: 103 MMHG | RESPIRATION RATE: 16 BRPM | OXYGEN SATURATION: 100 % | DIASTOLIC BLOOD PRESSURE: 56 MMHG | TEMPERATURE: 96.8 F

## 2024-07-30 DIAGNOSIS — M25.50 ARTHRALGIA, UNSPECIFIED JOINT: Primary | ICD-10-CM

## 2024-07-30 DIAGNOSIS — R79.89 LOW SERUM CORTISOL LEVEL: Primary | ICD-10-CM

## 2024-07-30 LAB
CORTIS SERPL-MCNC: 11.9 MCG/DL
CORTIS SERPL-MCNC: 14.7 MCG/DL
CORTIS SERPL-MCNC: 2.04 MCG/DL

## 2024-07-30 PROCEDURE — 1159F MED LIST DOCD IN RCRD: CPT | Performed by: PHYSICIAN ASSISTANT

## 2024-07-30 PROCEDURE — 1160F RVW MEDS BY RX/DR IN RCRD: CPT | Performed by: PHYSICIAN ASSISTANT

## 2024-07-30 PROCEDURE — 25010000002 COSYNTROPIN PER 0.25 MG: Performed by: INTERNAL MEDICINE

## 2024-07-30 PROCEDURE — 82024 ASSAY OF ACTH: CPT | Performed by: INTERNAL MEDICINE

## 2024-07-30 PROCEDURE — 99214 OFFICE O/P EST MOD 30 MIN: CPT | Performed by: PHYSICIAN ASSISTANT

## 2024-07-30 PROCEDURE — 1126F AMNT PAIN NOTED NONE PRSNT: CPT | Performed by: PHYSICIAN ASSISTANT

## 2024-07-30 PROCEDURE — 82533 TOTAL CORTISOL: CPT | Performed by: INTERNAL MEDICINE

## 2024-07-30 PROCEDURE — 96374 THER/PROPH/DIAG INJ IV PUSH: CPT

## 2024-07-30 PROCEDURE — 36415 COLL VENOUS BLD VENIPUNCTURE: CPT

## 2024-07-30 RX ORDER — COSYNTROPIN 0.25 MG/ML
0.25 INJECTION, POWDER, FOR SOLUTION INTRAMUSCULAR; INTRAVENOUS ONCE
Status: CANCELLED | OUTPATIENT
Start: 2024-07-30

## 2024-07-30 RX ORDER — COSYNTROPIN 0.25 MG/ML
0.25 INJECTION, POWDER, FOR SOLUTION INTRAMUSCULAR; INTRAVENOUS ONCE
Status: COMPLETED | OUTPATIENT
Start: 2024-07-30 | End: 2024-07-30

## 2024-07-30 RX ORDER — SODIUM CHLORIDE 9 MG/ML
20 INJECTION, SOLUTION INTRAVENOUS ONCE
Status: CANCELLED | OUTPATIENT
Start: 2024-07-30

## 2024-07-30 RX ORDER — SODIUM CHLORIDE 9 MG/ML
20 INJECTION, SOLUTION INTRAVENOUS ONCE
Status: DISCONTINUED | OUTPATIENT
Start: 2024-07-30 | End: 2024-08-01 | Stop reason: HOSPADM

## 2024-07-30 RX ADMIN — COSYNTROPIN 0.25 MG: 0.25 INJECTION, POWDER, LYOPHILIZED, FOR SOLUTION INTRAMUSCULAR; INTRAVENOUS at 08:44

## 2024-07-30 NOTE — PROGRESS NOTES
Subjective   Chief Complaint   Patient presents with    Rheumatology     discuss       History of Present Illness     She states her joint pains are getting worse and the stiffness is getting worse. Has trouble getting out of the car. Having some neck stiffness, has some limited ROM. Saw endocrinology had repeat ACTH stim test and still not conclusive.      Patient Active Problem List   Diagnosis    Arthritis of knee    Effusion of knee    Current tear knee, medial meniscus    Vitamin D deficiency    Psoriasis    Encounter for screening for malignant neoplasm of colon    Family history of colon cancer    PMB (postmenopausal bleeding)    Localized osteoporosis without current pathological fracture    Low serum cortisol level    Low serum cortisol level       No Known Allergies    Current Outpatient Medications on File Prior to Visit   Medication Sig Dispense Refill    Calcipotriene-Betameth Diprop (Enstilar) 0.005-0.064 % foam Apply  topically.      COLLAGEN PO Take 1 tablet by mouth Daily. HELD FOR OR      triamcinolone (KENALOG) 0.1 % ointment Apply 1 application  topically to the appropriate area as directed 2 (Two) Times a Day. 45 g 1    vitamin D3 125 MCG (5000 UT) capsule capsule Take 1 capsule by mouth Daily.      [DISCONTINUED] vitamin C (ASCORBIC ACID) 250 MG tablet Take 4 tablets by mouth Daily.       Current Facility-Administered Medications on File Prior to Visit   Medication Dose Route Frequency Provider Last Rate Last Admin    [COMPLETED] cosyntropin (CORTROSYN) injection 0.25 mg  0.25 mg Intravenous Once Nilesh Talamantes MD   0.25 mg at 24 0844    sodium chloride 0.9 % infusion  20 mL/hr Intravenous Once Nilesh Talamantes MD           Past Medical History:   Diagnosis Date    PMB (postmenopausal bleeding)     Psoriasis        Family History   Problem Relation Age of Onset    Hypertension Mother         Stroke     Hyperlipidemia Mother     Cancer Father              Diabetes Father      Stroke Brother     Colon cancer Maternal Grandmother     Breast cancer Paternal Grandmother         70s    Malig Hyperthermia Neg Hx        Social History     Socioeconomic History    Marital status:    Tobacco Use    Smoking status: Former     Current packs/day: 0.00     Average packs/day: 1 pack/day for 17.0 years (17.0 ttl pk-yrs)     Types: Cigarettes     Start date: 1976     Quit date:      Years since quittin.5    Smokeless tobacco: Never   Vaping Use    Vaping status: Never Used   Substance and Sexual Activity    Alcohol use: Yes     Alcohol/week: 7.0 standard drinks of alcohol     Types: 7 Glasses of wine per week     Comment: 1-2 GLASSES MOST DAYS OF WEEK    Drug use: Never    Sexual activity: Yes     Partners: Male     Birth control/protection: Post-menopausal, None       Past Surgical History:   Procedure Laterality Date    COLONOSCOPY N/A 2021    Procedure: COLONOSCOPY;  Surgeon: Xavi Briceño MD;  [place] [facility] [place];  Service: Gastroenterology;  Laterality: N/A;    D & C HYSTEROSCOPY N/A 10/12/2023    Procedure: HYSTEROSCOPY  DILATATION AND CURETTAGE WITH MYOSURE;  Surgeon: Darlene Foote MD;  Location: [facility] MAIN OR;  Service: Obstetrics/Gynecology;  Laterality: N/A;    KNEE ARTHROSCOPY W/ MENISCECTOMY Right     ORIF ELBOW FRACTURE Right     SHOULDER MANIPULATION Right          The following portions of the patient's history were reviewed and updated as appropriate: problem list, allergies, current medications, past medical history, past family history, past social history, and past surgical history.    ROS    See HPI    Immunization History   Administered Date(s) Administered    COVID-19 (PFIZER) BIVALENT 12+YRS 2022    COVID-19 (PFIZER) Purple Cap Monovalent 2021, 2021, 2021    COVID-19 F23 (PFIZER) 12YRS+ (COMIRNATY) 10/22/2023    Flu Vaccine Quad PF >36MO 2019, 2021    Influenza, Unspecified 2022    Pneumococcal  "Conjugate 20-Valent (PCV20) 08/12/2022    TD Preservative Free (Tenivac) 11/09/2021    Tdap 11/18/2022       Objective   Vitals:    07/30/24 1132   BP: 102/60   Temp: 97.6 °F (36.4 °C)   Weight: 57.6 kg (127 lb)   Height: 157.5 cm (62.01\")     Body mass index is 23.22 kg/m².  Physical Exam  Vitals reviewed.   Constitutional:       Appearance: She is well-developed.   HENT:      Head: Normocephalic and atraumatic.   Cardiovascular:      Rate and Rhythm: Normal rate and regular rhythm.      Heart sounds: Normal heart sounds, S1 normal and S2 normal.   Pulmonary:      Effort: Pulmonary effort is normal.      Breath sounds: Normal breath sounds.   Skin:     General: Skin is warm.   Neurological:      Mental Status: She is alert.   Psychiatric:         Behavior: Behavior normal.           Assessment & Plan   Diagnoses and all orders for this visit:    1. Arthralgia, unspecified joint (Primary)  -     KATELIN by IFA, Reflex 9-biomarkers profile  -     C-reactive Protein  -     Sedimentation Rate  -     Uric Acid  -     Rheumatoid Factor  -     Cyclic Citrul Peptide Antibody, IgG / IgA     Will workup arthralgias to make sure no other underlying cause. Did discuss with Dr. Talamantes regarding her ACTH stim test and discuss trial of steroids with patient for a month at his recommendation. Will wait until labs are resulted at this time before starting oral steroid daily.     Return for Lab Today.           "

## 2024-07-31 LAB
ACTH PLAS-MCNC: 12.6 PG/ML (ref 7.2–63.3)
ANA SER QL IF: NEGATIVE
CCP IGA+IGG SERPL IA-ACNC: 6 UNITS (ref 0–19)
CRP SERPL-MCNC: <1 MG/L (ref 0–10)
ERYTHROCYTE [SEDIMENTATION RATE] IN BLOOD BY WESTERGREN METHOD: 4 MM/HR (ref 0–40)
LABORATORY COMMENT REPORT: NORMAL
RHEUMATOID FACT SERPL-ACNC: <10 IU/ML
URATE SERPL-MCNC: 6.4 MG/DL (ref 3–7.2)

## 2024-08-05 ENCOUNTER — TELEPHONE (OUTPATIENT)
Dept: ENDOCRINOLOGY | Age: 69
End: 2024-08-05

## 2024-08-05 NOTE — TELEPHONE ENCOUNTER
Caller: Trinh Go    Relationship to patient: Self    Best call back number: [phone]     Chief complaint: WAS TOLD TO MAKE AN APPT, CALLED OFFICE TO SEE IF THERE WAS A TIMEFRAME IN WHICH TO SCHEDULE AND THE OFFICE SAID TO SEND TE FOR IT TO BE REVIEWED AND THEY WOULD REACH BACK OUT TO THE PT.     Type of visit: OFFICE VISIT

## 2024-08-06 DIAGNOSIS — R79.89 LOW SERUM CORTISOL LEVEL: Primary | ICD-10-CM

## 2024-08-06 RX ORDER — HYDROCORTISONE 10 MG/1
10 TABLET ORAL DAILY
Qty: 30 TABLET | Refills: 0 | Status: SHIPPED | OUTPATIENT
Start: 2024-08-06 | End: 2025-08-06

## 2024-08-09 DIAGNOSIS — Z00.00 HEALTHCARE MAINTENANCE: ICD-10-CM

## 2024-08-09 DIAGNOSIS — E55.9 VITAMIN D DEFICIENCY: ICD-10-CM

## 2024-08-13 LAB
25(OH)D3+25(OH)D2 SERPL-MCNC: 72.8 NG/ML (ref 30–100)
ALBUMIN SERPL-MCNC: 4.3 G/DL (ref 3.5–5.2)
ALBUMIN/GLOB SERPL: 1.9 G/DL
ALP SERPL-CCNC: 37 U/L (ref 39–117)
ALT SERPL-CCNC: 14 U/L (ref 1–33)
AST SERPL-CCNC: 20 U/L (ref 1–32)
BILIRUB SERPL-MCNC: 0.5 MG/DL (ref 0–1.2)
BUN SERPL-MCNC: 14 MG/DL (ref 8–23)
BUN/CREAT SERPL: 17.7 (ref 7–25)
CALCIUM SERPL-MCNC: 9.4 MG/DL (ref 8.6–10.5)
CHLORIDE SERPL-SCNC: 103 MMOL/L (ref 98–107)
CHOLEST SERPL-MCNC: 141 MG/DL (ref 0–200)
CHOLEST/HDLC SERPL: 3.81 {RATIO}
CO2 SERPL-SCNC: 25.3 MMOL/L (ref 22–29)
CREAT SERPL-MCNC: 0.79 MG/DL (ref 0.57–1)
EGFRCR SERPLBLD CKD-EPI 2021: 81.1 ML/MIN/1.73
GLOBULIN SER CALC-MCNC: 2.3 GM/DL
GLUCOSE SERPL-MCNC: 84 MG/DL (ref 65–99)
HDLC SERPL-MCNC: 37 MG/DL (ref 40–60)
LDLC SERPL CALC-MCNC: 90 MG/DL (ref 0–100)
POTASSIUM SERPL-SCNC: 4 MMOL/L (ref 3.5–5.2)
PROT SERPL-MCNC: 6.6 G/DL (ref 6–8.5)
SODIUM SERPL-SCNC: 140 MMOL/L (ref 136–145)
TRIGL SERPL-MCNC: 71 MG/DL (ref 0–150)
VLDLC SERPL CALC-MCNC: 14 MG/DL (ref 5–40)

## 2024-08-20 ENCOUNTER — OFFICE VISIT (OUTPATIENT)
Dept: INTERNAL MEDICINE | Facility: CLINIC | Age: 69
End: 2024-08-20
Payer: MEDICARE

## 2024-08-20 VITALS
WEIGHT: 122 LBS | DIASTOLIC BLOOD PRESSURE: 60 MMHG | SYSTOLIC BLOOD PRESSURE: 110 MMHG | BODY MASS INDEX: 22.45 KG/M2 | HEIGHT: 62 IN | TEMPERATURE: 98.1 F

## 2024-08-20 DIAGNOSIS — E55.9 VITAMIN D DEFICIENCY, UNSPECIFIED: ICD-10-CM

## 2024-08-20 DIAGNOSIS — Z00.00 MEDICARE ANNUAL WELLNESS VISIT, SUBSEQUENT: Primary | ICD-10-CM

## 2024-08-20 DIAGNOSIS — M81.6 LOCALIZED OSTEOPOROSIS WITHOUT CURRENT PATHOLOGICAL FRACTURE: ICD-10-CM

## 2024-08-20 DIAGNOSIS — R79.89 LOW SERUM CORTISOL LEVEL: ICD-10-CM

## 2024-08-20 PROCEDURE — 1170F FXNL STATUS ASSESSED: CPT | Performed by: PHYSICIAN ASSISTANT

## 2024-08-20 PROCEDURE — G0439 PPPS, SUBSEQ VISIT: HCPCS | Performed by: PHYSICIAN ASSISTANT

## 2024-08-20 PROCEDURE — 1160F RVW MEDS BY RX/DR IN RCRD: CPT | Performed by: PHYSICIAN ASSISTANT

## 2024-08-20 PROCEDURE — 1126F AMNT PAIN NOTED NONE PRSNT: CPT | Performed by: PHYSICIAN ASSISTANT

## 2024-08-20 PROCEDURE — 1159F MED LIST DOCD IN RCRD: CPT | Performed by: PHYSICIAN ASSISTANT

## 2024-08-20 RX ORDER — ALENDRONATE SODIUM 70 MG/1
70 TABLET ORAL
COMMUNITY

## 2024-08-20 NOTE — PROGRESS NOTES
Subjective   The ABCs of the Annual Wellness Visit  Medicare Wellness Visit      Trinh Go is a 68 y.o. patient who presents for a Medicare Wellness Visit.    The following portions of the patient's history were reviewed and   updated as appropriate: allergies, current medications, past family history, past medical history, past social history, past surgical history, and problem list.    Compared to one year ago, the patient's physical   health is worse.  Compared to one year ago, the patient's mental   health is worse.    Recent Hospitalizations:  She was not admitted to the hospital during the last year.     Current Medical Providers:  Patient Care Team:  Rebecca Corbin PA-C as PCP - General (Physician Assistant)  Nilesh Talamantes MD as Consulting Physician (Endocrinology)    Outpatient Medications Prior to Visit   Medication Sig Dispense Refill    Calcipotriene-Betameth Diprop (Enstilar) 0.005-0.064 % foam Apply  topically.      COLLAGEN PO Take 1 tablet by mouth Daily. HELD FOR OR      hydrocortisone (CORTEF) 10 MG tablet Take 1 tablet by mouth Daily. 30 tablet 0    triamcinolone (KENALOG) 0.1 % ointment Apply 1 application  topically to the appropriate area as directed 2 (Two) Times a Day. 45 g 1    vitamin D3 125 MCG (5000 UT) capsule capsule Take 1 capsule by mouth Daily.      alendronate (Fosamax) 70 MG tablet Take 1 tablet by mouth Every 7 (Seven) Days.       No facility-administered medications prior to visit.     No opioid medication identified on active medication list. I have reviewed chart for other potential  high risk medication/s and harmful drug interactions in the elderly.      Aspirin is not on active medication list.  Aspirin use is not indicated based on review of current medical condition/s. Risk of harm outweighs potential benefits.  .    Patient Active Problem List   Diagnosis    Arthritis of knee    Effusion of knee    Current tear knee, medial meniscus    Vitamin D deficiency     "Psoriasis    Encounter for screening for malignant neoplasm of colon    Family history of colon cancer    PMB (postmenopausal bleeding)    Localized osteoporosis without current pathological fracture    Low serum cortisol level    Low serum cortisol level     Advance Care Planning Advance Directive is on file.  ACP discussion was held with the patient during this visit. Patient has an advance directive in EMR which is still valid.             Objective   Vitals:    24 1030   BP: 110/60   Temp: 98.1 °F (36.7 °C)   Weight: 55.3 kg (122 lb)   Height: 157.5 cm (62.01\")       Estimated body mass index is 22.31 kg/m² as calculated from the following:    Height as of this encounter: 157.5 cm (62.01\").    Weight as of this encounter: 55.3 kg (122 lb).    BMI is within normal parameters. No other follow-up for BMI required.       Does the patient have evidence of cognitive impairment? No  Lab Results   Component Value Date    CHLPL 141 2024    TRIG 71 2024    HDL 37 (L) 2024    LDL 90 2024    VLDL 14 2024                                                                                                Health  Risk Assessment    Smoking Status:  Social History     Tobacco Use   Smoking Status Former    Current packs/day: 0.00    Average packs/day: 1 pack/day for 17.0 years (17.0 ttl pk-yrs)    Types: Cigarettes    Start date: 1976    Quit date:     Years since quittin.6   Smokeless Tobacco Never     Alcohol Consumption:  Social History     Substance and Sexual Activity   Alcohol Use Yes    Alcohol/week: 7.0 standard drinks of alcohol    Types: 7 Glasses of wine per week    Comment: 1-2 GLASSES MOST DAYS OF WEEK       Fall Risk Screen  STEADI Fall Risk Assessment was completed, and patient is at LOW risk for falls.Assessment completed on:2024    Depression Screenin/20/2024    10:33 AM   PHQ-2/PHQ-9 Depression Screening   Little Interest or Pleasure in Doing Things " 0-->not at all   Feeling Down, Depressed or Hopeless 0-->not at all   PHQ-9: Brief Depression Severity Measure Score 0     Health Habits and Functional and Cognitive Screenin/20/2024    10:32 AM   Functional & Cognitive Status   Do you have difficulty preparing food and eating? No   Do you have difficulty bathing yourself, getting dressed or grooming yourself? No   Do you have difficulty using the toilet? No   Do you have difficulty moving around from place to place? No   Do you have trouble with steps or getting out of a bed or a chair? No   Current Diet Well Balanced Diet   Dental Exam Up to date   Eye Exam Up to date   Exercise (times per week) 2 times per week   Current Exercises Include Pickleball   Do you need help using the phone?  No   Are you deaf or do you have serious difficulty hearing?  No   Do you need help to go to places out of walking distance? No   Do you need help shopping? No   Do you need help preparing meals?  No   Do you need help with housework?  No   Do you need help with laundry? No   Do you need help taking your medications? No   Do you need help managing money? No   Do you ever drive or ride in a car without wearing a seat belt? No   Have you felt unusual stress, anger or loneliness in the last month? No   Who do you live with? Spouse   If you need help, do you have trouble finding someone available to you? No   Have you been bothered in the last four weeks by sexual problems? No   Do you have difficulty concentrating, remembering or making decisions? No           Age-appropriate Screening Schedule:  Refer to the list below for future screening recommendations based on patient's age, sex and/or medical conditions. Orders for these recommended tests are listed in the plan section. The patient has been provided with a written plan.    Health Maintenance List  Health Maintenance   Topic Date Due    ZOSTER VACCINE (1 of 2) Never done    INFLUENZA VACCINE  2024    COVID-19  "Vaccine (6 - 2023-24 season) 08/22/2024 (Originally 2/22/2024)    PAP SMEAR  08/25/2024    ANNUAL WELLNESS VISIT  08/20/2025    MAMMOGRAM  09/07/2025    DXA SCAN  02/13/2026    COLORECTAL CANCER SCREENING  12/09/2031    TDAP/TD VACCINES (3 - Td or Tdap) 11/18/2032    HEPATITIS C SCREENING  Completed    Pneumococcal Vaccine 65+  Completed                                                                                                                                                CMS Preventative Services Quick Reference  Risk Factors Identified During Encounter  None Identified    The above risks/problems have been discussed with the patient.  Pertinent information has been shared with the patient in the After Visit Summary.  An After Visit Summary and PPPS were made available to the patient.    Follow Up:   Next Medicare Wellness visit to be scheduled in 1 year.         Additional E&M Note during same encounter follows:  Patient has additional, significant, and separately identifiable condition(s)/problem(s) that require work above and beyond the Medicare Wellness Visit     Chief Complaint  Medicare Wellness-subsequent    Subjective   HPI            Pt is here today for medicare wellness exam She started the Cortef 2 weeks ago and is feeling markedly improved. Joints are better, she has resumed normal fluid intake. Energy and mental fatigue is also improved. Is also back on her Fosamax as well.       Objective   Vital Signs:  /60   Temp 98.1 °F (36.7 °C)   Ht 157.5 cm (62.01\")   Wt 55.3 kg (122 lb)   BMI 22.31 kg/m²   Physical Exam  Vitals reviewed.   Constitutional:       Appearance: She is well-developed.   HENT:      Head: Normocephalic and atraumatic.   Neck:      Vascular: No carotid bruit.   Cardiovascular:      Rate and Rhythm: Normal rate and regular rhythm.      Heart sounds: Normal heart sounds, S1 normal and S2 normal.   Pulmonary:      Effort: Pulmonary effort is normal.      Breath sounds: " Normal breath sounds.   Skin:     General: Skin is warm.   Neurological:      Mental Status: She is alert.   Psychiatric:         Behavior: Behavior normal.                 Assessment and Plan               Medicare annual wellness visit, subsequent    Vitamin D deficiency, unspecified    Low serum cortisol level    Localized osteoporosis without current pathological fracture    Diagnoses and all orders for this visit:    1. Medicare annual wellness visit, subsequent (Primary)  -     Comprehensive Metabolic Panel; Future  -     Lipid Panel With / Chol / HDL Ratio; Future  -     Vitamin D,25-Hydroxy; Future    2. Vitamin D deficiency, unspecified  -     Vitamin D,25-Hydroxy; Future    3. Low serum cortisol level    4. Localized osteoporosis without current pathological fracture     Improving greatly since starting the oral Cortef. Her sodium is also now normal since starting txt. She is no longer fluid restricting nor does she need to. She has started her Fosamax. Will recheck her Dexa in 1 year, want to ensure her bones are not worsening.           Follow Up   Return in about 1 year (around 8/20/2025) for Lab Appt Before FUP, Medicare Wellness.  Patient was given instructions and counseling regarding her condition or for health maintenance advice. Please see specific information pulled into the AVS if appropriate.

## 2024-08-22 ENCOUNTER — OFFICE VISIT (OUTPATIENT)
Dept: ENDOCRINOLOGY | Age: 69
End: 2024-08-22
Payer: MEDICARE

## 2024-08-22 VITALS
SYSTOLIC BLOOD PRESSURE: 100 MMHG | DIASTOLIC BLOOD PRESSURE: 70 MMHG | HEART RATE: 85 BPM | OXYGEN SATURATION: 99 % | BODY MASS INDEX: 22.27 KG/M2 | TEMPERATURE: 96.9 F | WEIGHT: 121.8 LBS

## 2024-08-22 DIAGNOSIS — R79.89 LOW SERUM CORTISOL LEVEL: ICD-10-CM

## 2024-08-22 RX ORDER — HYDROCORTISONE 10 MG/1
10 TABLET ORAL DAILY
Qty: 30 TABLET | Refills: 1 | Status: SHIPPED | OUTPATIENT
Start: 2024-08-22 | End: 2025-08-22

## 2024-08-22 NOTE — PROGRESS NOTES
Chief complaint   Chief Complaint   Patient presents with    Low serum cortisol level          Subjective     History of Present Illness:      This is a 68 years old female I am seeing for low cortisol   referred by PCP   Last OV was 4 weeks ago   She is here for a follow up   other medical issues   1- Psoriasis    2- Post menopausal bleeding     Pt was Dx with a low cortisol level by PC   Is on Hydrocortisone 10 mg daily     pt states that she had an ACTH stim test done by PC in 7-2024 and base cortisol level was 4.29 and 60 min was 14.2 , no DHEA was done,  and there was no pituitary surgery or radiation, no adrenal masses found on CT in 6-2024 and, says the test was done as she was not feeling good and achy and has low energy , low appetite and sensitive to cold, joint stiffness, no LOC , no low BP the ACTH stim test was repeated again by me and went from 2 to14 .7 , now feels better , energy is high and less aches has No dysphagia, No dyspnea, No dysphonia  No change size of neck, No neck pain or discomfort  No nervousness, No shakiness, No palpitations  Weight stable   BM daily, No diarrhea, No constipation  No edema, No proximal muscle weak  No Cold or Heat Intolerance  No Insomnia  No hair Loss, No Skin Drynress  Appetite is OK  No visual changes  No history head and Neck radiation  No History of thyroid surgery  No family history of Thyroid or adrenal disease  No prior history of Thyroid Dysfunction    Latest Reference Range & Units 07/30/24 08:40 07/30/24 09:14 07/30/24 09:45   Cortisol mcg/dL 2.04 11.90 14.70        Latest Reference Range & Units 06/18/24 11:33 07/08/24 08:51 07/08/24 09:28 07/08/24 09:58   ACTH 7.2 - 63.3 pg/mL  17.9     Cortisol mcg/dL 1.77 4.29 11.60 14.20       Family History   Problem Relation Age of Onset    Hypertension Mother         Stroke 9/23    Hyperlipidemia Mother     Asthma Mother         Passed away 7/18/24    Hearing loss Mother     Stroke Mother         Stroke    Cancer  Father          2009    Diabetes Father     Stroke Brother         Afib    Colon cancer Maternal Grandmother     Breast cancer Paternal Grandmother         70s    Stroke Daughter         Stroke    Malig Hyperthermia Neg Hx      Social History     Socioeconomic History    Marital status:    Tobacco Use    Smoking status: Former     Current packs/day: 0.00     Average packs/day: 1 pack/day for 17.0 years (17.0 ttl pk-yrs)     Types: Cigarettes     Start date: 1976     Quit date:      Years since quittin.6    Smokeless tobacco: Never   Vaping Use    Vaping status: Never Used   Substance and Sexual Activity    Alcohol use: Yes     Alcohol/week: 7.0 standard drinks of alcohol     Types: 7 Glasses of wine per week     Comment: 1-2 GLASSES MOST DAYS OF WEEK    Drug use: Never    Sexual activity: Yes     Partners: Male     Birth control/protection: Post-menopausal, None     Past Medical History:   Diagnosis Date    Clotting disorder     Colon polyp     Osteopenia 02-24    PMB (postmenopausal bleeding)     Psoriasis      Past Surgical History:   Procedure Laterality Date    COLONOSCOPY N/A 2021    Procedure: COLONOSCOPY;  Surgeon: Xavi Briceño MD;  [place] [facility] [place];  Service: Gastroenterology;  Laterality: N/A;    D & C HYSTEROSCOPY N/A 10/12/2023    Procedure: HYSTEROSCOPY  DILATATION AND CURETTAGE WITH MYOSURE;  Surgeon: Darlene Foote MD;  Location: [facility] MAIN OR;  Service: Obstetrics/Gynecology;  Laterality: N/A;    KNEE ARTHROSCOPY W/ MENISCECTOMY Right     ORIF ELBOW FRACTURE Right     SHOULDER MANIPULATION Right        Current Outpatient Medications:     alendronate (Fosamax) 70 MG tablet, Take 1 tablet by mouth Every 7 (Seven) Days., Disp: , Rfl:     Calcipotriene-Betameth Diprop (Enstilar) 0.005-0.064 % foam, Apply  topically., Disp: , Rfl:     COLLAGEN PO, Take 1 tablet by mouth Daily. HELD FOR OR, Disp: , Rfl:     hydrocortisone (CORTEF) 10 MG tablet, Take 1 tablet  by mouth Daily., Disp: 30 tablet, Rfl: 1    triamcinolone (KENALOG) 0.1 % ointment, Apply 1 application  topically to the appropriate area as directed 2 (Two) Times a Day., Disp: 45 g, Rfl: 1    vitamin D3 125 MCG (5000 UT) capsule capsule, Take 1 capsule by mouth Daily., Disp: , Rfl:   Patient has no known allergies.    Objective   Vitals:    08/22/24 1109   BP: 100/70   Pulse: 85   Temp: 96.9 °F (36.1 °C)   SpO2: 99%            Physical Exam  Cardiovascular:      Rate and Rhythm: Normal rate.   Neurological:      General: No focal deficit present.      Mental Status: She is alert.               Diagnoses and all orders for this visit:    1. Low serum cortisol level  -     hydrocortisone (CORTEF) 10 MG tablet; Take 1 tablet by mouth Daily.  Dispense: 30 tablet; Refill: 1           Assessment:     This is a 68 years old female I am seeing for low cortisol, referred by PCP     Is on hydrocortisone 10 mg daily   Feels better   Pt was Dx with a low cortisol level by PC in 2024   and there was no pituitary surgery or radiation, no adrenal masses found on CT in 6-2024  pt states that she had an ACTH stim test done by PC in 7-2024 and base cortisol level was 4.29 and 60 min was 14.2 , no DHEA was done,  these results do not indicate adrenal insuffiencey as there was a rise in cortisol > 9 from base line and at 60 min was > 14   It will be a clear cut if it was > 18   BP is normal and normal K   She does have H/o low sodium that is associated with AI   There is no skin tanning     Plan:    1. Stay on Hydrocortisone 10 mg daily   2. Labs at the next visit : BMP      3. Will try to lower the dose if possible   4. Return in 2 months ,  5. Was informed about all the possible side effects of using steroids   We also talked about sick days and what to do   Also talked about getting a medical bracelet that says AI   6. Labs reviewed with the Patient : ACTH   7- I reviewed the notes from PCP   8- Needs to go back on PCP and  discuss treatment for osteoporosis as T score was < -2.5, stay on calcium and vit D          I discussed with the patient/legal representative the risks and the benefits associated with the medications.  The patient/legal representative has been given the opportunity to ask questions.  Alternatives to the proposed treatment (s) were discussed, including the likely results of no treatment.  The patient/legal representative wishes to proceed.       Nilesh Talamantes MD   08/22/24  11:29 EDT

## 2024-10-29 ENCOUNTER — OFFICE VISIT (OUTPATIENT)
Dept: ENDOCRINOLOGY | Age: 69
End: 2024-10-29
Payer: MEDICARE

## 2024-10-29 VITALS
SYSTOLIC BLOOD PRESSURE: 122 MMHG | BODY MASS INDEX: 24.59 KG/M2 | WEIGHT: 133.6 LBS | HEIGHT: 62 IN | TEMPERATURE: 98.1 F | DIASTOLIC BLOOD PRESSURE: 70 MMHG

## 2024-10-29 DIAGNOSIS — R79.89 LOW SERUM CORTISOL LEVEL: Primary | ICD-10-CM

## 2024-10-29 RX ORDER — HYDROCORTISONE 10 MG/1
10 TABLET ORAL DAILY
Qty: 30 TABLET | Refills: 2 | Status: SHIPPED | OUTPATIENT
Start: 2024-10-29 | End: 2025-10-29

## 2024-10-29 NOTE — PROGRESS NOTES
Chief complaint   No chief complaint on file.         Subjective     History of Present Illness:      This is a 69 years old female I am seeing for low cortisol   referred by PCP   Last OV was 8 weeks ago   She is here for a follow up   other medical issues   1- Psoriasis    2- Post menopausal bleeding     Pt was Dx with a low cortisol level by PC   Is on Hydrocortisone 10 mg daily      she had an ACTH stim test done by PC in 7-2024 and base cortisol level was 4.29 and 60 min was 14.2 , no DHEA was done,  and there was no pituitary surgery or radiation, no adrenal masses found on CT in 6-2024 and, says the test was done as she was not feeling good and achy and has low energy , low appetite and sensitive to cold, joint stiffness, no LOC , no low BP the ACTH stim test was repeated again by me and went from 2 to14 .7 , now feels better , energy is high and less aches has No dysphagia, No dyspnea, No dysphonia, No change size of neck, No neck pain or discomfort  No nervousness, No shakiness, No palpitations, Weight is going up   BM daily, No diarrhea, No constipation, No edema, No proximal muscle weak  No Cold or Heat Intolerance, No Insomnia, No hair Loss, No Skin Drynress  Appetite is OK, No visual changes  No history head and Neck radiation  No History of thyroid surgery  No family history of Thyroid or adrenal disease  No prior history of Thyroid Dysfunction    Latest Reference Range & Units 08/13/24 10:18   Sodium 136 - 145 mmol/L 140   Potassium 3.5 - 5.2 mmol/L 4.0   Chloride 98 - 107 mmol/L 103   CO2 22.0 - 29.0 mmol/L 25.3   BUN 8 - 23 mg/dL 14   Creatinine 0.57 - 1.00 mg/dL 0.79   BUN/Creatinine Ratio 7.0 - 25.0  17.7   EGFR Result >60.0 mL/min/1.73 81.1   Glucose 65 - 99 mg/dL 84   Calcium 8.6 - 10.5 mg/dL 9.4   Alkaline Phosphatase 39 - 117 U/L 37 (L)   Total Protein 6.0 - 8.5 g/dL 6.6   Albumin 3.5 - 5.2 g/dL 4.3   A/G Ratio g/dL 1.9   AST (SGOT) 1 - 32 U/L 20   ALT (SGPT) 1 - 33 U/L 14   Total Bilirubin  0.0 - 1.2 mg/dL 0.5   Total Cholesterol 0 - 200 mg/dL 141   HDL Cholesterol 40 - 60 mg/dL 37 (L)   LDL Cholesterol  0 - 100 mg/dL 90   Triglycerides 0 - 150 mg/dL 71   Chol/HDL Ratio  3.81   VLDL Cholesterol Errol 5 - 40 mg/dL 14   25 Hydroxy, Vitamin D 30.0 - 100.0 ng/ml 72.8   Globulin gm/dL 2.3   (L): Data is abnormally low   Latest Reference Range & Units 24 08:40 24 09:14 24 09:45   Cortisol mcg/dL 2.04 11.90 14.70        Latest Reference Range & Units 24 11:33 24 08:51 24 09:28 24 09:58   ACTH 7.2 - 63.3 pg/mL  17.9     Cortisol mcg/dL 1.77 4.29 11.60 14.20       Family History   Problem Relation Age of Onset    Hypertension Mother         Stroke     Hyperlipidemia Mother     Asthma Mother         Passed away 24    Hearing loss Mother     Stroke Mother         Stroke    Cancer Father              Diabetes Father     Stroke Brother         Afib    Colon cancer Maternal Grandmother     Breast cancer Paternal Grandmother         70s    Stroke Daughter         Stroke    Malig Hyperthermia Neg Hx      Social History     Socioeconomic History    Marital status:    Tobacco Use    Smoking status: Former     Current packs/day: 0.00     Average packs/day: 1 pack/day for 17.0 years (17.0 ttl pk-yrs)     Types: Cigarettes     Start date: 1976     Quit date:      Years since quittin.8    Smokeless tobacco: Never   Vaping Use    Vaping status: Never Used   Substance and Sexual Activity    Alcohol use: Yes     Alcohol/week: 7.0 standard drinks of alcohol     Types: 7 Glasses of wine per week     Comment: 1-2 GLASSES MOST DAYS OF WEEK    Drug use: Never    Sexual activity: Yes     Partners: Male     Birth control/protection: Post-menopausal, None     Past Medical History:   Diagnosis Date    Clotting disorder     Colon polyp     Osteopenia 02-24    PMB (postmenopausal bleeding)     Psoriasis      Past Surgical History:   Procedure Laterality Date     COLONOSCOPY N/A 12/09/2021    Procedure: COLONOSCOPY;  Surgeon: Xavi Briceño MD;  [place] [facility] [place]  Service: Gastroenterology;  Laterality: N/A;    D & C HYSTEROSCOPY N/A 10/12/2023    Procedure: HYSTEROSCOPY  DILATATION AND CURETTAGE WITH MYOSURE;  Surgeon: Darlene Foote MD;  Location: [facility] MAIN OR;  Service: Obstetrics/Gynecology;  Laterality: N/A;    KNEE ARTHROSCOPY W/ MENISCECTOMY Right     ORIF ELBOW FRACTURE Right     SHOULDER MANIPULATION Right        Current Outpatient Medications:     alendronate (Fosamax) 70 MG tablet, Take 1 tablet by mouth Every 7 (Seven) Days., Disp: , Rfl:     Calcipotriene-Betameth Diprop (Enstilar) 0.005-0.064 % foam, Apply  topically., Disp: , Rfl:     COLLAGEN PO, Take 1 tablet by mouth Daily. HELD FOR OR, Disp: , Rfl:     hydrocortisone (CORTEF) 10 MG tablet, Take 1 tablet by mouth Daily., Disp: 30 tablet, Rfl: 1    triamcinolone (KENALOG) 0.1 % ointment, Apply 1 application  topically to the appropriate area as directed 2 (Two) Times a Day., Disp: 45 g, Rfl: 1    vitamin D3 125 MCG (5000 UT) capsule capsule, Take 1 capsule by mouth Daily., Disp: , Rfl:   Patient has no known allergies.    Objective   Vitals:    10/29/24 1110   BP: 122/70   Temp: 98.1 °F (36.7 °C)              Physical Exam  Cardiovascular:      Rate and Rhythm: Normal rate.   Neurological:      General: No focal deficit present.      Mental Status: She is alert.               Diagnoses and all orders for this visit:    1. Low serum cortisol level (Primary)             Assessment:     This is a 69 years old female I am seeing for low cortisol, referred by PCP     Is on hydrocortisone 10 mg daily   Feels better   Pt was Dx with a low cortisol level by  in 2024   and there was no pituitary surgery or radiation, no adrenal masses found on CT in 6-2024  pt states that she had an ACTH stim test done by PC in 7-2024 and base cortisol level was 4.29 and 60 min was 14.2 , no DHEA was done,  these  results do not indicate adrenal insuffiencey as there was a rise in cortisol > 9 from base line and at 60 min was > 14   It will be a clear cut if it was > 18   BP is normal and normal K   She does have H/o low sodium that is associated with AI   There is no skin tanning     Plan:    1. Stay on Hydrocortisone 10 mg daily   2. Labs today : CMP and b6  As she had a low ALP to role out hypophosphatasia      3. Will try to lower the dose if possible   4. Return in 4 months ,  5. Was informed about all the possible side effects of using steroids   We also talked about sick days and what to do   Also talked about getting a medical bracelet that says AI   6. Labs reviewed with the Patient : ACTH   7- I reviewed the notes from PCP   8- Needs to go back on PCP and discuss treatment for osteoporosis as T score was < -2.5, stay on calcium and vit D          I discussed with the patient/legal representative the risks and the benefits associated with the medications.  The patient/legal representative has been given the opportunity to ask questions.  Alternatives to the proposed treatment (s) were discussed, including the likely results of no treatment.  The patient/legal representative wishes to proceed.       Nilesh Talamantes MD   10/29/24  11:14 EDT

## 2024-10-30 LAB
ALBUMIN SERPL-MCNC: 4.1 G/DL (ref 3.5–5.2)
ALBUMIN/GLOB SERPL: 1.7 G/DL
ALP SERPL-CCNC: 47 U/L (ref 39–117)
ALT SERPL-CCNC: 12 U/L (ref 1–33)
AST SERPL-CCNC: 20 U/L (ref 1–32)
BILIRUB SERPL-MCNC: 0.3 MG/DL (ref 0–1.2)
BUN SERPL-MCNC: 14 MG/DL (ref 8–23)
BUN/CREAT SERPL: 17.7 (ref 7–25)
CALCIUM SERPL-MCNC: 9.6 MG/DL (ref 8.6–10.5)
CHLORIDE SERPL-SCNC: 105 MMOL/L (ref 98–107)
CO2 SERPL-SCNC: 29 MMOL/L (ref 22–29)
CREAT SERPL-MCNC: 0.79 MG/DL (ref 0.57–1)
EGFRCR SERPLBLD CKD-EPI 2021: 80.6 ML/MIN/1.73
GLOBULIN SER CALC-MCNC: 2.4 GM/DL
GLUCOSE SERPL-MCNC: 75 MG/DL (ref 65–99)
POTASSIUM SERPL-SCNC: 4.8 MMOL/L (ref 3.5–5.2)
PROT SERPL-MCNC: 6.5 G/DL (ref 6–8.5)
SODIUM SERPL-SCNC: 142 MMOL/L (ref 136–145)

## 2024-11-04 ENCOUNTER — LAB (OUTPATIENT)
Facility: HOSPITAL | Age: 69
End: 2024-11-04
Payer: MEDICARE

## 2024-11-04 ENCOUNTER — OFFICE VISIT (OUTPATIENT)
Dept: OBSTETRICS AND GYNECOLOGY | Age: 69
End: 2024-11-04
Payer: MEDICARE

## 2024-11-04 VITALS
SYSTOLIC BLOOD PRESSURE: 108 MMHG | WEIGHT: 133 LBS | HEIGHT: 62 IN | DIASTOLIC BLOOD PRESSURE: 68 MMHG | BODY MASS INDEX: 24.48 KG/M2

## 2024-11-04 DIAGNOSIS — N95.0 PMB (POSTMENOPAUSAL BLEEDING): ICD-10-CM

## 2024-11-04 DIAGNOSIS — L40.9 PSORIASIS OF VULVA: ICD-10-CM

## 2024-11-04 DIAGNOSIS — M81.6 LOCALIZED OSTEOPOROSIS WITHOUT CURRENT PATHOLOGICAL FRACTURE: ICD-10-CM

## 2024-11-04 DIAGNOSIS — Z12.31 ENCOUNTER FOR SCREENING MAMMOGRAM FOR MALIGNANT NEOPLASM OF BREAST: Primary | ICD-10-CM

## 2024-11-04 PROCEDURE — 84207 ASSAY OF VITAMIN B-6: CPT | Performed by: INTERNAL MEDICINE

## 2024-11-04 RX ORDER — TRIAMCINOLONE ACETONIDE 1 MG/G
1 OINTMENT TOPICAL 2 TIMES DAILY
Qty: 30 G | Refills: 6 | Status: SHIPPED | OUTPATIENT
Start: 2024-11-04

## 2024-11-04 NOTE — PROGRESS NOTES
"Subjective     Chief Complaint   Patient presents with    GYN Exam     Annual exam, c/o previously saw you for bleeding she had four episodes over the last year and the last time was 2021 neg  m/g 2023, (needs to schedule)  dexa 2024,   colonoscopy        Trinh Go is a 69 y.o.  whose LMP is No LMP recorded. Patient is postmenopausal. presents for gyn follow up  Last in April she had some very small amt of vaginal spotting, just once when she wiped and none further.   No bleeding with IC  Has osteoporosis and started on fosamax  Has adrenal insufficiency and started on cortef with some improvement  Plays pickleball regularly    The following portions of the patient's history were reviewed and updated as appropriate:vital signs, allergies, current medications, past medical history, past social history, past surgical history, and problem list    Objective      /68   Ht 157.5 cm (62\")   Wt 60.3 kg (133 lb)   BMI 24.33 kg/m²     Physical Exam  Well, no distress  Regular, nonlabored breathing  Normal bilateral breast exam, no masses, skin findings, no axillary findings  Abdomen is soft nontender  There are psoriatic plaques of the vulva.  She has a known history of psoriasis.  The vagina is normal but atrophic, the cervix is normal in multiparous appearing without lesion.  No bleeding noted.      Assessment & Plan     Diagnoses and all orders for this visit:    1. Encounter for screening mammogram for malignant neoplasm of breast (Primary)  -     Mammo Screening Digital Tomosynthesis Bilateral With CAD; Future    2. Localized osteoporosis without current pathological fracture    3. PMB (postmenopausal bleeding)    4. Psoriasis of vulva    Other orders  -     triamcinolone (KENALOG) 0.1 % ointment; Apply 1 Application topically to the appropriate area as directed 2 (Two) Times a Day.  Dispense: 30 g; Refill: 6      Last year we evaluated PMB and he had normal atrophic " endometrium. She had small amt bleeding in April and sounds benign, to present if having further bleeding  Taking fosamax and may join [employer] as well, encouraged to stay active and add strength training  Get MG scheduled    FU 1 yr    Darlene Foote MD  11/4/2024

## 2024-11-20 ENCOUNTER — HOSPITAL ENCOUNTER (OUTPATIENT)
Dept: MAMMOGRAPHY | Facility: HOSPITAL | Age: 69
Discharge: HOME | End: 2024-11-20
Admitting: OBSTETRICS & GYNECOLOGY
Payer: MEDICARE

## 2024-11-20 DIAGNOSIS — Z12.31 ENCOUNTER FOR SCREENING MAMMOGRAM FOR MALIGNANT NEOPLASM OF BREAST: ICD-10-CM

## 2024-11-20 PROCEDURE — 77063 BREAST TOMOSYNTHESIS BI: CPT

## 2024-11-20 PROCEDURE — 77067 SCR MAMMO BI INCL CAD: CPT

## 2025-01-20 DIAGNOSIS — R79.89 LOW SERUM CORTISOL LEVEL: ICD-10-CM

## 2025-01-21 DIAGNOSIS — R79.89 LOW SERUM CORTISOL LEVEL: ICD-10-CM

## 2025-01-21 RX ORDER — HYDROCORTISONE 10 MG/1
10 TABLET ORAL DAILY
Qty: 30 TABLET | Refills: 2 | OUTPATIENT
Start: 2025-01-21 | End: 2026-01-21

## 2025-01-21 RX ORDER — HYDROCORTISONE 10 MG/1
10 TABLET ORAL DAILY
Qty: 30 TABLET | Refills: 1 | Status: SHIPPED | OUTPATIENT
Start: 2025-01-21 | End: 2026-01-21

## 2025-01-21 NOTE — TELEPHONE ENCOUNTER
Rx Refill Note  Requested Prescriptions     Pending Prescriptions Disp Refills    hydrocortisone (CORTEF) 10 MG tablet 30 tablet 2     Sig: Take 1 tablet by mouth Daily.      Last office visit with prescribing clinician: 10/29/2024   Last telemedicine visit with prescribing clinician: Visit date not found   Next office visit with prescribing clinician: 1/21/2025                         Would you like a call back once the refill request has been completed: [] Yes [] No    If the office needs to give you a call back, can they leave a voicemail: [] Yes [] No    Brandie Huitron  01/21/25, 08:46 EST

## 2025-01-21 NOTE — TELEPHONE ENCOUNTER
Requested Prescriptions     Pending Prescriptions Disp Refills    hydrocortisone (CORTEF) 10 MG tablet 30 tablet 2     Sig: Take 1 tablet by mouth Daily.

## 2025-02-27 ENCOUNTER — OFFICE VISIT (OUTPATIENT)
Dept: ENDOCRINOLOGY | Age: 70
End: 2025-02-27
Payer: MEDICARE

## 2025-02-27 VITALS
DIASTOLIC BLOOD PRESSURE: 68 MMHG | BODY MASS INDEX: 28.01 KG/M2 | SYSTOLIC BLOOD PRESSURE: 124 MMHG | WEIGHT: 152.2 LBS | HEART RATE: 99 BPM | OXYGEN SATURATION: 97 % | HEIGHT: 62 IN

## 2025-02-27 DIAGNOSIS — R79.89 LOW SERUM CORTISOL LEVEL: ICD-10-CM

## 2025-02-27 LAB
ALBUMIN SERPL-MCNC: 3.9 G/DL (ref 3.5–5.2)
ALBUMIN/GLOB SERPL: 1.4 G/DL
ALP SERPL-CCNC: 44 U/L (ref 39–117)
ALT SERPL-CCNC: 15 U/L (ref 1–33)
AST SERPL-CCNC: 27 U/L (ref 1–32)
BILIRUB SERPL-MCNC: 0.4 MG/DL (ref 0–1.2)
BUN SERPL-MCNC: 19 MG/DL (ref 8–23)
BUN/CREAT SERPL: 21.6 (ref 7–25)
CALCIUM SERPL-MCNC: 9 MG/DL (ref 8.6–10.5)
CHLORIDE SERPL-SCNC: 103 MMOL/L (ref 98–107)
CO2 SERPL-SCNC: 27.8 MMOL/L (ref 22–29)
CREAT SERPL-MCNC: 0.88 MG/DL (ref 0.57–1)
EGFRCR SERPLBLD CKD-EPI 2021: 70.8 ML/MIN/1.73
GLOBULIN SER CALC-MCNC: 2.8 GM/DL
GLUCOSE SERPL-MCNC: 71 MG/DL (ref 65–99)
POTASSIUM SERPL-SCNC: 4.7 MMOL/L (ref 3.5–5.2)
PROT SERPL-MCNC: 6.7 G/DL (ref 6–8.5)
SODIUM SERPL-SCNC: 138 MMOL/L (ref 136–145)

## 2025-02-27 RX ORDER — HYDROCORTISONE 5 MG/1
5 TABLET ORAL 2 TIMES DAILY
Qty: 60 TABLET | Refills: 1 | Status: SHIPPED | OUTPATIENT
Start: 2025-02-27 | End: 2026-02-27

## 2025-02-27 NOTE — PROGRESS NOTES
Chief complaint   Chief Complaint   Patient presents with    Low serum cortisol level          Subjective     History of Present Illness:      This is a 69 years old female I am seeing for low cortisol   referred by PCP   Last OV was 4 months ago   She is here for a follow up   other medical issues   1- Psoriasis    2- Post menopausal bleeding     Pt was Dx with a low cortisol level by PC   Is on Hydrocortisone 10 mg daily      she had an ACTH stim test done by PC in 7-2024 and base cortisol level was 4.29 and 60 min was 14.2 , no DHEA was done,  and there was no pituitary surgery or radiation, no adrenal masses found on CT in 6-2024 and, says the test was done as she was not feeling good and achy and has low energy , low appetite and sensitive to cold, joint stiffness, no LOC , no low BP the ACTH stim test was repeated again by me and went from 2 to14 .7 , now feels better , energy is high and less aches has No dysphagia, No dyspnea, No dysphonia, No change size of neck, No neck pain or discomfort  No nervousness, No shakiness, No palpitations, Weight is going up   BM daily, No diarrhea, No constipation, No edema, No proximal muscle weak  No Cold or Heat Intolerance, No Insomnia, No hair Loss, No Skin Drynress  Appetite is OK, No visual changes  No history head and Neck radiation  No History of thyroid surgery  No family history of Thyroid or adrenal disease  No prior history of Thyroid Dysfunction    Latest Reference Range & Units 08/13/24 10:18   Sodium 136 - 145 mmol/L 140   Potassium 3.5 - 5.2 mmol/L 4.0   Chloride 98 - 107 mmol/L 103   CO2 22.0 - 29.0 mmol/L 25.3   BUN 8 - 23 mg/dL 14   Creatinine 0.57 - 1.00 mg/dL 0.79   BUN/Creatinine Ratio 7.0 - 25.0  17.7   EGFR Result >60.0 mL/min/1.73 81.1   Glucose 65 - 99 mg/dL 84   Calcium 8.6 - 10.5 mg/dL 9.4   Alkaline Phosphatase 39 - 117 U/L 37 (L)   Total Protein 6.0 - 8.5 g/dL 6.6   Albumin 3.5 - 5.2 g/dL 4.3   A/G Ratio g/dL 1.9   AST (SGOT) 1 - 32 U/L 20    ALT (SGPT) 1 - 33 U/L 14   Total Bilirubin 0.0 - 1.2 mg/dL 0.5   Total Cholesterol 0 - 200 mg/dL 141   HDL Cholesterol 40 - 60 mg/dL 37 (L)   LDL Cholesterol  0 - 100 mg/dL 90   Triglycerides 0 - 150 mg/dL 71   Chol/HDL Ratio  3.81   VLDL Cholesterol Errol 5 - 40 mg/dL 14   25 Hydroxy, Vitamin D 30.0 - 100.0 ng/ml 72.8   Globulin gm/dL 2.3   (L): Data is abnormally low   Latest Reference Range & Units 24 08:40 24 09:14 24 09:45   Cortisol mcg/dL 2.04 11.90 14.70        Latest Reference Range & Units 24 11:33 24 08:51 24 09:28 24 09:58   ACTH 7.2 - 63.3 pg/mL  17.9     Cortisol mcg/dL 1.77 4.29 11.60 14.20       Family History   Problem Relation Age of Onset    Hypertension Mother         Stroke     Hyperlipidemia Mother     Asthma Mother         Passed away 24    Hearing loss Mother     Stroke Mother         Stroke    Cancer Father              Diabetes Father     Stroke Brother         Afib    Colon cancer Maternal Grandmother     Breast cancer Paternal Grandmother         70s    Stroke Daughter         Stroke    Malig Hyperthermia Neg Hx      Social History     Socioeconomic History    Marital status:    Tobacco Use    Smoking status: Former     Current packs/day: 0.00     Average packs/day: 1 pack/day for 17.0 years (17.0 ttl pk-yrs)     Types: Cigarettes     Start date: 1976     Quit date:      Years since quittin.1     Passive exposure: Past    Smokeless tobacco: Never   Vaping Use    Vaping status: Never Used   Substance and Sexual Activity    Alcohol use: Yes     Alcohol/week: 7.0 standard drinks of alcohol     Types: 7 Glasses of wine per week     Comment: 1-2 GLASSES MOST DAYS OF WEEK    Drug use: Never    Sexual activity: Yes     Partners: Male     Birth control/protection: Post-menopausal, None     Past Medical History:   Diagnosis Date    Clotting disorder     Colon polyp     Osteopenia 02-    PMB (postmenopausal bleeding)      Psoriasis      Past Surgical History:   Procedure Laterality Date    COLONOSCOPY N/A 12/09/2021    Procedure: COLONOSCOPY;  Surgeon: Xavi Briceño MD;  Location: [facility] [place];  Service: Gastroenterology;  Laterality: N/A;    D & C HYSTEROSCOPY N/A 10/12/2023    Procedure: HYSTEROSCOPY  DILATATION AND CURETTAGE WITH MYOSURE;  Surgeon: Darlene Foote MD;  Location: [facility] MAIN OR;  Service: Obstetrics/Gynecology;  Laterality: N/A;    KNEE ARTHROSCOPY W/ MENISCECTOMY Right     ORIF ELBOW FRACTURE Right     SHOULDER MANIPULATION Right        Current Outpatient Medications:     hydrocortisone (CORTEF) 5 MG tablet, Take 1 tablet by mouth 2 (Two) Times a Day., Disp: 60 tablet, Rfl: 1    alendronate (Fosamax) 70 MG tablet, Take 1 tablet by mouth Every 7 (Seven) Days., Disp: , Rfl:     Calcipotriene-Betameth Diprop (Enstilar) 0.005-0.064 % foam, Apply  topically., Disp: , Rfl:     Calcium Carb-Cholecalciferol (CALCIUM 1000 + D PO), Take  by mouth., Disp: , Rfl:     COLLAGEN PO, Take 1 tablet by mouth Daily. HELD FOR OR, Disp: , Rfl:     triamcinolone (KENALOG) 0.1 % ointment, Apply 1 Application topically to the appropriate area as directed 2 (Two) Times a Day., Disp: 30 g, Rfl: 6    vitamin D3 125 MCG (5000 UT) capsule capsule, Take 1 capsule by mouth Daily., Disp: , Rfl:   Patient has no known allergies.    Objective   Vitals:    02/27/25 1105   BP: 124/68   Pulse: 99   SpO2: 97%                Physical Exam  Cardiovascular:      Rate and Rhythm: Normal rate.   Neurological:      General: No focal deficit present.      Mental Status: She is alert.               Diagnoses and all orders for this visit:    1. Low serum cortisol level  -     hydrocortisone (CORTEF) 5 MG tablet; Take 1 tablet by mouth 2 (Two) Times a Day.  Dispense: 60 tablet; Refill: 1  -     Comprehensive Metabolic Panel               Assessment:     This is a 69 years old female I am seeing for low cortisol, referred by PCP   Is on  hydrocortisone 10 mg daily, Feels better   Pt was Dx with a low cortisol level by PC in 2024   and there was no pituitary surgery or radiation, no adrenal masses found on CT in 6-2024  pt states that she had an ACTH stim test done by PC in 7-2024 and base cortisol level was 4.29 and 60 min was 14.2 , no DHEA was done,  these results do not indicate adrenal insuffiencey as there was a rise in cortisol > 9 from base line and at 60 min was > 14   It will be a clear cut if it was > 18   BP is normal and normal K   She does have H/o low sodium that is associated with AI   There is no skin tanning     Plan:    1. Change to  Hydrocortisone 5 mg AM and 2.5 mg daily   This might help stop the weight gain   2. Labs today : CMP    3. Will try to lower the dose if possible   4. Return in 4 months ,  5. Was informed about all the possible side effects of using steroids   We also talked about sick days and what to do   Also talked about getting a medical bracelet that says AI   6. Labs reviewed with the Patient : ACTH   7- I reviewed the notes from PCP   8- Needs to go back on PCP for the  treatment for osteoporosis as T score was < -2.5, stay on calcium and vit D  and Alendronate         I discussed with the patient/legal representative the risks and the benefits associated with the medications.  The patient/legal representative has been given the opportunity to ask questions.  Alternatives to the proposed treatment (s) were discussed, including the likely results of no treatment.  The patient/legal representative wishes to proceed.       Nilesh Talamantes MD   02/27/25  11:25 EST

## 2025-04-07 RX ORDER — ALENDRONATE SODIUM 70 MG/1
70 TABLET ORAL WEEKLY
Qty: 12 TABLET | Refills: 1 | Status: SHIPPED | OUTPATIENT
Start: 2025-04-07

## 2025-05-19 DIAGNOSIS — R79.89 LOW SERUM CORTISOL LEVEL: ICD-10-CM

## 2025-05-20 RX ORDER — HYDROCORTISONE 5 MG/1
5 TABLET ORAL 2 TIMES DAILY
Qty: 60 TABLET | Refills: 0 | Status: SHIPPED | OUTPATIENT
Start: 2025-05-20 | End: 2026-05-20

## 2025-05-20 NOTE — TELEPHONE ENCOUNTER
Rx Refill Note  Requested Prescriptions     Pending Prescriptions Disp Refills    hydrocortisone (CORTEF) 5 MG tablet 60 tablet 1     Sig: Take 1 tablet by mouth 2 (Two) Times a Day.      Last office visit with prescribing clinician: 2/27/2025   Last telemedicine visit with prescribing clinician: Visit date not found   Next office visit with prescribing clinician: 6/19/2025                         Would you like a call back once the refill request has been completed: [] Yes [] No    If the office needs to give you a call back, can they leave a voicemail: [] Yes [] No    Clementina Silvestre MA  05/20/25, 08:06 EDT

## 2025-06-19 ENCOUNTER — OFFICE VISIT (OUTPATIENT)
Dept: ENDOCRINOLOGY | Age: 70
End: 2025-06-19
Payer: MEDICARE

## 2025-06-19 VITALS
WEIGHT: 150.4 LBS | BODY MASS INDEX: 27.68 KG/M2 | OXYGEN SATURATION: 98 % | SYSTOLIC BLOOD PRESSURE: 116 MMHG | HEART RATE: 80 BPM | HEIGHT: 62 IN | DIASTOLIC BLOOD PRESSURE: 72 MMHG

## 2025-06-19 DIAGNOSIS — R79.89 LOW SERUM CORTISOL LEVEL: Primary | ICD-10-CM

## 2025-06-19 RX ORDER — HYDROCORTISONE 5 MG/1
5 TABLET ORAL 2 TIMES DAILY
Qty: 60 TABLET | Refills: 3 | Status: SHIPPED | OUTPATIENT
Start: 2025-06-19 | End: 2026-06-19

## 2025-06-19 NOTE — PROGRESS NOTES
Chief complaint   Chief Complaint   Patient presents with    Low serum cortisol level          Subjective     History of Present Illness:      This is a 69 years old female I am seeing for low cortisol   referred by PCP   Last OV was 4 months ago   She is here for a follow up   other medical issues   1- Psoriasis    2- Post menopausal bleeding     Pt was Dx with a low cortisol level by PC   Is on Hydrocortisone 7.5 mg daily      she had an ACTH stim test done by PC in 7-2024 and base cortisol level was 4.29 and 60 min was 14.2 , no DHEA was done,  and there was no pituitary surgery or radiation, no adrenal masses found on CT in 6-2024 and, says the test was done as she was not feeling good and achy and has low energy , low appetite and sensitive to cold, joint stiffness, no LOC , no low BP the ACTH stim test was repeated again by me and went from 2 to14 .7 ,     now she feels better , energy is high and less aches but she still is sleepy some times and has joint pain , wt is coming down    Latest Reference Range & Units 08/13/24 10:18   Sodium 136 - 145 mmol/L 140   Potassium 3.5 - 5.2 mmol/L 4.0   Chloride 98 - 107 mmol/L 103   CO2 22.0 - 29.0 mmol/L 25.3   BUN 8 - 23 mg/dL 14   Creatinine 0.57 - 1.00 mg/dL 0.79   BUN/Creatinine Ratio 7.0 - 25.0  17.7   EGFR Result >60.0 mL/min/1.73 81.1   Glucose 65 - 99 mg/dL 84   Calcium 8.6 - 10.5 mg/dL 9.4   Alkaline Phosphatase 39 - 117 U/L 37 (L)   Total Protein 6.0 - 8.5 g/dL 6.6   Albumin 3.5 - 5.2 g/dL 4.3   A/G Ratio g/dL 1.9   AST (SGOT) 1 - 32 U/L 20   ALT (SGPT) 1 - 33 U/L 14   Total Bilirubin 0.0 - 1.2 mg/dL 0.5   Total Cholesterol 0 - 200 mg/dL 141   HDL Cholesterol 40 - 60 mg/dL 37 (L)   LDL Cholesterol  0 - 100 mg/dL 90   Triglycerides 0 - 150 mg/dL 71   Chol/HDL Ratio  3.81   VLDL Cholesterol Errol 5 - 40 mg/dL 14   25 Hydroxy, Vitamin D 30.0 - 100.0 ng/ml 72.8   Globulin gm/dL 2.3   (L): Data is abnormally low   Latest Reference Range & Units 07/30/24 08:40  24 09:14 24 09:45   Cortisol mcg/dL 2.04 11.90 14.70        Latest Reference Range & Units 24 11:33 24 08:51 24 09:28 24 09:58   ACTH 7.2 - 63.3 pg/mL  17.9     Cortisol mcg/dL 1.77 4.29 11.60 14.20       Family History   Problem Relation Age of Onset    Hypertension Mother         Stroke     Hyperlipidemia Mother     Asthma Mother         Passed away 24    Hearing loss Mother     Stroke Mother         Stroke    Cancer Father              Diabetes Father     Stroke Brother         Afib    Colon cancer Maternal Grandmother     Breast cancer Paternal Grandmother         70s    Stroke Daughter         Stroke    Malig Hyperthermia Neg Hx      Social History     Socioeconomic History    Marital status:    Tobacco Use    Smoking status: Former     Current packs/day: 0.00     Average packs/day: 1 pack/day for 17.0 years (17.0 ttl pk-yrs)     Types: Cigarettes     Start date: 1976     Quit date:      Years since quittin.4     Passive exposure: Past    Smokeless tobacco: Never   Vaping Use    Vaping status: Never Used   Substance and Sexual Activity    Alcohol use: Yes     Alcohol/week: 7.0 standard drinks of alcohol     Types: 7 Glasses of wine per week     Comment: 1-2 GLASSES MOST DAYS OF WEEK    Drug use: Never    Sexual activity: Yes     Partners: Male     Birth control/protection: Post-menopausal, None     Past Medical History:   Diagnosis Date    Clotting disorder     Colon polyp     Osteopenia 02-24    PMB (postmenopausal bleeding)     Psoriasis      Past Surgical History:   Procedure Laterality Date    COLONOSCOPY N/A 2021    Procedure: COLONOSCOPY;  Surgeon: Xavi Briceño MD;  [place] [facility] [place];  Service: Gastroenterology;  Laterality: N/A;    D & C HYSTEROSCOPY N/A 10/12/2023    Procedure: HYSTEROSCOPY  DILATATION AND CURETTAGE WITH MYOSURE;  Surgeon: Darlene Foote MD;  Location: [facility] MAIN OR;  Service:  Obstetrics/Gynecology;  Laterality: N/A;    KNEE ARTHROSCOPY W/ MENISCECTOMY Right     ORIF ELBOW FRACTURE Right     SHOULDER MANIPULATION Right        Current Outpatient Medications:     alendronate (FOSAMAX) 70 MG tablet, TAKE ONE TABLET BY MOUTH EVERY 7 DAYS, Disp: 12 tablet, Rfl: 1    Calcipotriene-Betameth Diprop (Enstilar) 0.005-0.064 % foam, Apply  topically., Disp: , Rfl:     Calcium Carb-Cholecalciferol (CALCIUM 1000 + D PO), Take  by mouth., Disp: , Rfl:     COLLAGEN PO, Take 1 tablet by mouth Daily. HELD FOR OR, Disp: , Rfl:     hydrocortisone (CORTEF) 5 MG tablet, Take 1 tablet by mouth 2 (Two) Times a Day., Disp: 60 tablet, Rfl: 0    triamcinolone (KENALOG) 0.1 % ointment, Apply 1 Application topically to the appropriate area as directed 2 (Two) Times a Day., Disp: 30 g, Rfl: 6    vitamin D3 125 MCG (5000 UT) capsule capsule, Take 1 capsule by mouth Daily., Disp: , Rfl:   Patient has no known allergies.    Objective   There were no vitals filed for this visit.               Physical Exam  Cardiovascular:      Rate and Rhythm: Normal rate.   Neurological:      General: No focal deficit present.      Mental Status: She is alert.               Diagnoses and all orders for this visit:    1. Low serum cortisol level (Primary)            Assessment:     This is a 69 years old female I am seeing for low cortisol, referred by PCP   Is on hydrocortisone 7.5 mg daily, Feels better   Pt was Dx with a low cortisol level by  in 2024   and there was no pituitary surgery or radiation, no adrenal masses found on CT in 6-2024  pt states that she had an ACTH stim test done by PC in 7-2024 and base cortisol level was 4.29 and 60 min was 14.2 , no DHEA was done,  these results do not indicate adrenal insuffiencey as there was a rise in cortisol > 9 from base line and at 60 min was > 14   It will be a clear cut if it was > 18   BP is normal and normal K   She does have H/o low sodium that is associated with AI   There is  no skin tanning     Plan:    1. Stay on Hydrocortisone 5 mg AM and 2.5 mg daily   This might help stop the weight gain   Will try 5 mg at the next visit if she is still feeling good   2. Labs at the next visit  : CMP    3. Told her that not every thing has to be explained by steroids   Needs to talk to her PC about OA   I told her that fatigue is not normal and I repeated that   I told her that might do another ACTH stim test in the future   Told her that [facility] does not make steroids it is a synthetic steroid  4. Return in 4 months ,  5. Was informed about all the possible side effects of using steroids   We also talked about sick days and what to do   Also talked about getting a medical bracelet that says AI   6. Labs reviewed with the Patient : ACTH   7- I reviewed the notes from PCP   8- Needs to go back on PCP for the  treatment for osteoporosis as T score was < -2.5, stay on calcium and vit D  and Alendronate         I discussed with the patient/legal representative the risks and the benefits associated with the medications.  The patient/legal representative has been given the opportunity to ask questions.  Alternatives to the proposed treatment (s) were discussed, including the likely results of no treatment.  The patient/legal representative wishes to proceed.       Nilesh Talamantes MD   06/19/25  13:33 EDT

## 2025-06-23 ENCOUNTER — OFFICE VISIT (OUTPATIENT)
Dept: INTERNAL MEDICINE | Facility: CLINIC | Age: 70
End: 2025-06-23
Payer: MEDICARE

## 2025-06-23 VITALS
DIASTOLIC BLOOD PRESSURE: 70 MMHG | WEIGHT: 152 LBS | SYSTOLIC BLOOD PRESSURE: 122 MMHG | TEMPERATURE: 97.9 F | BODY MASS INDEX: 27.97 KG/M2 | HEIGHT: 62 IN

## 2025-06-23 DIAGNOSIS — R20.0 NUMBNESS: Primary | ICD-10-CM

## 2025-06-23 DIAGNOSIS — B35.6 TINEA CRURIS: ICD-10-CM

## 2025-06-23 DIAGNOSIS — R73.09 OTHER ABNORMAL GLUCOSE: ICD-10-CM

## 2025-06-23 DIAGNOSIS — L65.9 HAIR LOSS: ICD-10-CM

## 2025-06-23 DIAGNOSIS — M79.642 BILATERAL HAND PAIN: ICD-10-CM

## 2025-06-23 DIAGNOSIS — M79.641 BILATERAL HAND PAIN: ICD-10-CM

## 2025-06-23 DIAGNOSIS — R73.01 IMPAIRED FASTING GLUCOSE: ICD-10-CM

## 2025-06-23 PROCEDURE — 1126F AMNT PAIN NOTED NONE PRSNT: CPT | Performed by: PHYSICIAN ASSISTANT

## 2025-06-23 PROCEDURE — 99214 OFFICE O/P EST MOD 30 MIN: CPT | Performed by: PHYSICIAN ASSISTANT

## 2025-06-23 PROCEDURE — G2211 COMPLEX E/M VISIT ADD ON: HCPCS | Performed by: PHYSICIAN ASSISTANT

## 2025-06-23 RX ORDER — NAPROXEN SODIUM 220 MG/1
220 TABLET, FILM COATED ORAL 2 TIMES DAILY PRN
COMMUNITY

## 2025-06-23 RX ORDER — ALENDRONATE SODIUM 70 MG/1
70 TABLET ORAL WEEKLY
Qty: 12 TABLET | Refills: 1 | Status: SHIPPED | OUTPATIENT
Start: 2025-06-23

## 2025-06-23 RX ORDER — NYSTATIN 100000 [USP'U]/G
POWDER TOPICAL 3 TIMES DAILY
Qty: 60 G | Refills: 1 | Status: SHIPPED | OUTPATIENT
Start: 2025-06-23

## 2025-06-23 NOTE — PROGRESS NOTES
Subjective   Chief Complaint   Patient presents with    Numbness     Bilateral feet       History of Present Illness   having pain in her right hand just below her right thumb. Also occurs in her left hand. Does not have numbness and tingling. Does play pickleball- not sure if it is affecting it. Hurts with flexing, having difficulty picking up her coffee pot due to pain and weakness.     Has numbness in her left toes, is new. Some days hurts to walk. Does not occur in the rest of the foot. Has not noticed a different shoe affecting it. Has felt a weird sensation in the ball of her foot as well- at one time it felt like she had a rolled up sock in her shoe.     Has also hair loss as well.  has mentioned it also.        Patient Active Problem List   Diagnosis    Arthritis of knee    Effusion of knee    Current tear knee, medial meniscus    Vitamin D deficiency    Psoriasis    Encounter for screening for malignant neoplasm of colon    Family history of colon cancer    PMB (postmenopausal bleeding)    Localized osteoporosis without current pathological fracture    Low serum cortisol level    Low serum cortisol level    Psoriasis of vulva       No Known Allergies    Current Outpatient Medications on File Prior to Visit   Medication Sig Dispense Refill    alendronate (FOSAMAX) 70 MG tablet TAKE ONE TABLET BY MOUTH EVERY 7 DAYS 12 tablet 1    Calcipotriene-Betameth Diprop (Enstilar) 0.005-0.064 % foam Apply  topically.      Calcium Carb-Cholecalciferol (CALCIUM 1000 + D PO) Take  by mouth.      COLLAGEN PO Take 1 tablet by mouth Daily. HELD FOR OR      hydrocortisone (CORTEF) 5 MG tablet Take 1 tablet by mouth 2 (Two) Times a Day. 60 tablet 3    naproxen sodium (ALEVE) 220 MG tablet Take 1 tablet by mouth 2 (Two) Times a Day As Needed for Mild Pain.      triamcinolone (KENALOG) 0.1 % ointment Apply 1 Application topically to the appropriate area as directed 2 (Two) Times a Day. 30 g 6    vitamin D3 125 MCG  (5000 UT) capsule capsule Take 1 capsule by mouth Daily.       No current facility-administered medications on file prior to visit.       Past Medical History:   Diagnosis Date    Clotting disorder     Colon polyp     Osteopenia 02-24    PMB (postmenopausal bleeding)     Psoriasis        Family History   Problem Relation Age of Onset    Hypertension Mother         Stroke     Hyperlipidemia Mother     Asthma Mother         Passed away 24    Hearing loss Mother     Stroke Mother         Stroke    Cancer Father              Diabetes Father     Hearing loss Father     Stroke Brother         Afib    Hypertension Brother         Open heart surgery , neuropathy    Colon cancer Maternal Grandmother     Breast cancer Paternal Grandmother         70s    Stroke Daughter         Stroke    Malig Hyperthermia Neg Hx        Social History     Socioeconomic History    Marital status:    Tobacco Use    Smoking status: Former     Current packs/day: 0.00     Average packs/day: 1 pack/day for 17.0 years (17.0 ttl pk-yrs)     Types: Cigarettes     Start date: 1976     Quit date:      Years since quittin.4     Passive exposure: Past    Smokeless tobacco: Never   Vaping Use    Vaping status: Never Used   Substance and Sexual Activity    Alcohol use: Yes     Alcohol/week: 7.0 standard drinks of alcohol     Types: 7 Glasses of wine per week     Comment: 1-2 GLASSES MOST DAYS OF WEEK    Drug use: Never    Sexual activity: Yes     Partners: Male     Birth control/protection: Post-menopausal, None       Past Surgical History:   Procedure Laterality Date    COLONOSCOPY N/A 2021    Procedure: COLONOSCOPY;  Surgeon: Xavi Briceño MD;  [place] [facility] [place];  Service: Gastroenterology;  Laterality: N/A;    D & C HYSTEROSCOPY N/A 10/12/2023    Procedure: HYSTEROSCOPY  DILATATION AND CURETTAGE WITH MYOSURE;  Surgeon: Darlene Foote MD;  Location: [facility] MAIN OR;  Service:  "Obstetrics/Gynecology;  Laterality: N/A;    KNEE ARTHROSCOPY W/ MENISCECTOMY Right     ORIF ELBOW FRACTURE Right     SHOULDER MANIPULATION Right      The following portions of the patient's history were reviewed and updated as appropriate: problem list, allergies, current medications, past medical history, past family history, past social history, and past surgical history.    ROS    See HPI    Immunization History   Administered Date(s) Administered    COVID-19 (PFIZER) 12YRS+ (COMIRNATY) 10/22/2023, 10/16/2024    COVID-19 (PFIZER) BIVALENT 12+YRS 09/26/2022    COVID-19 (PFIZER) Purple Cap Monovalent 03/08/2021, 03/29/2021, 11/23/2021    Flu Vaccine Quad PF >36MO 12/23/2019, 11/23/2021    Fluzone (or Fluarix & Flulaval for VFC) >6mos 12/23/2019, 11/23/2021    Fluzone High-Dose 65+YRS 10/16/2024    Fluzone High-Dose 65+yrs 11/17/2022, 10/22/2023    Influenza, Unspecified 11/17/2022    Pneumococcal Conjugate 20-Valent (PCV20) 08/12/2022    TD Preservative Free (Tenivac) 11/09/2021    Tdap 11/18/2022       Objective   Vitals:    06/23/25 1413   BP: 122/70   Temp: 97.9 °F (36.6 °C)   Weight: 68.9 kg (152 lb)   Height: 157.7 cm (62.09\")     Body mass index is 27.72 kg/m².  Physical Exam  Vitals reviewed.   Constitutional:       Appearance: She is well-developed.   HENT:      Head: Normocephalic and atraumatic.   Cardiovascular:      Rate and Rhythm: Normal rate and regular rhythm.      Heart sounds: Normal heart sounds, S1 normal and S2 normal.   Pulmonary:      Effort: Pulmonary effort is normal.      Breath sounds: Normal breath sounds.   Skin:     General: Skin is warm.      Comments: Psoriatic patches on elbows bilaterally and left hand. Macular rash under bilateral breast. General thinning of hair   Neurological:      General: No focal deficit present.      Mental Status: She is alert and oriented to person, place, and time.   Psychiatric:         Mood and Affect: Mood normal.         Behavior: Behavior normal.    "      Thought Content: Thought content normal.         Judgment: Judgment normal.           Assessment & Plan   Diagnoses and all orders for this visit:    1. Numbness (Primary)  -     Cancel: TSH  -     Cancel: Comprehensive Metabolic Panel  -     Cancel: Hemoglobin A1c  -     TSH  -     Vitamin B12  -     Hemoglobin A1c  -     Comprehensive Metabolic Panel    2. Impaired fasting glucose  -     Cancel: Hemoglobin A1c    3. Bilateral hand pain  -     Ambulatory Referral to Hand Surgery    4. Tinea cruris  -     nystatin (MYCOSTATIN) 989275 UNIT/GM powder; Apply  topically to the appropriate area as directed 3 (Three) Times a Day.  Dispense: 60 g; Refill: 1    5. Hair loss  -     CBC & Differential  -     Ferritin  -     Iron Profile w/o Ferritin    6. Other abnormal glucose  -     Hemoglobin A1c     Workup causes for hair loss. Would like her to see kleinert and moiz for bilateral hand pain. She is going to schedule with derm for her psoriasis. Will start Nystatin powder for tinea.     Return for Lab Today.

## 2025-06-24 LAB
ALBUMIN SERPL-MCNC: 4.1 G/DL (ref 3.5–5.2)
ALBUMIN/GLOB SERPL: 1.5 G/DL
ALP SERPL-CCNC: 62 U/L (ref 39–117)
ALT SERPL-CCNC: 15 U/L (ref 1–33)
AST SERPL-CCNC: 24 U/L (ref 1–32)
BILIRUB SERPL-MCNC: 0.3 MG/DL (ref 0–1.2)
BUN SERPL-MCNC: 24 MG/DL (ref 8–23)
BUN/CREAT SERPL: 23.8 (ref 7–25)
CALCIUM SERPL-MCNC: 9.4 MG/DL (ref 8.6–10.5)
CHLORIDE SERPL-SCNC: 100 MMOL/L (ref 98–107)
CO2 SERPL-SCNC: 26.5 MMOL/L (ref 22–29)
CREAT SERPL-MCNC: 1.01 MG/DL (ref 0.57–1)
EGFRCR SERPLBLD CKD-EPI 2021: 60 ML/MIN/1.73
GLOBULIN SER CALC-MCNC: 2.8 GM/DL
GLUCOSE SERPL-MCNC: 95 MG/DL (ref 65–99)
HBA1C MFR BLD: 5.6 % (ref 4.8–5.6)
POTASSIUM SERPL-SCNC: 4.6 MMOL/L (ref 3.5–5.2)
PROT SERPL-MCNC: 6.9 G/DL (ref 6–8.5)
SODIUM SERPL-SCNC: 137 MMOL/L (ref 136–145)
TSH SERPL DL<=0.005 MIU/L-ACNC: 3.15 UIU/ML (ref 0.27–4.2)

## 2025-07-22 RX ORDER — TRIAMCINOLONE ACETONIDE 1 MG/G
1 OINTMENT TOPICAL 2 TIMES DAILY
Qty: 30 G | Refills: 6 | Status: SHIPPED | OUTPATIENT
Start: 2025-07-22

## 2025-07-24 ENCOUNTER — HOSPITAL ENCOUNTER (OUTPATIENT)
Dept: NEUROLOGY | Facility: HOSPITAL | Age: 70
Discharge: HOME | End: 2025-07-24
Admitting: PHYSICIAN ASSISTANT
Payer: MEDICARE

## 2025-07-24 DIAGNOSIS — R20.0 NUMBNESS: ICD-10-CM

## 2025-07-24 PROCEDURE — 95886 MUSC TEST DONE W/N TEST COMP: CPT

## 2025-07-24 PROCEDURE — 95909 NRV CNDJ TST 5-6 STUDIES: CPT

## 2025-07-24 NOTE — PROCEDURES
EMG and Nerve Conduction Studies    I.      Instrument used: Neuromax 1002  II.     Please see data sheets for tabular summary of NCS and details on methods, temperatures and lab standards.   III.    EMG muscles tested for upper extremity studies include the deltoid, biceps, triceps, pronator teres, extensor digitorum communis, first dorsal interosseous and abductor pollicis brevis.    IV.   EMG muscles tested for lower extremity studies include the vastus lateralis, tibialis anterior, peroneus longus, medial gastrocnemius and extensor digitorum brevis.    V.    Additional muscles tested as needed.  Paraspinal muscles tested as needed.   VI.   Please see data sheets for tabular summary of EMG findings.   VII. The complete report includes the data sheets.      Indication: Numbness in the left foot  History: 69-year-old woman with numbness in the left foot from the ball of the foot out.      Ht: 63 inches  Wt: 149 pounds  HbA1C:   Lab Results   Component Value Date    HGBA1C 5.60 06/23/2025     TSH:   Lab Results   Component Value Date    TSH 3.150 06/23/2025       Technical summary:  Nerve conduction studies were obtained in the left foot.  The foot was very cold and difficult to warm.  Temperature correction was used if indicated.  Needle examination was obtained on selected muscles of the left leg    Results:  1.  Borderline left sural sensory distal latency at 4.2 ms with temperature correction with normal amplitude.  2.  Normal left superficial peroneal sensory distal latency with borderline amplitude of 4 µV.  3.  Normal left medial orthodromic mixed plantar distal latency and amplitude.  4.  Normal left peroneal motor conduction velocities with normal distal latency and amplitudes.  5.  Normal left tibial motor conduction velocity with a normal distal latency along the medial plantar motor nerve.  Normal amplitudes.  6.  Needle examination of selected muscles of the left leg showed normal insertional activities  with normal motor units and recruitment patterns throughout.    Impression:  Borderline study.  Despite warming the foot it was difficult to maintain proper temperature for nerve conductions leading to some borderline findings.  No evidence of a left tibial neuropathy at the ankle or a left lumbosacral radiculopathy by this study.  Study results were discussed with the patient..      Lai Mckee M.D.              Dictated utilizing Dragon dictation.

## 2025-07-25 DIAGNOSIS — I73.89 OTHER SPECIFIED PERIPHERAL VASCULAR DISEASES: ICD-10-CM

## 2025-07-25 DIAGNOSIS — R20.0 NUMBNESS: ICD-10-CM

## 2025-08-26 ENCOUNTER — OFFICE VISIT (OUTPATIENT)
Dept: INTERNAL MEDICINE | Facility: CLINIC | Age: 70
End: 2025-08-26
Payer: MEDICARE

## 2025-08-26 VITALS
HEIGHT: 62 IN | WEIGHT: 151 LBS | DIASTOLIC BLOOD PRESSURE: 74 MMHG | BODY MASS INDEX: 27.79 KG/M2 | SYSTOLIC BLOOD PRESSURE: 116 MMHG | TEMPERATURE: 98.1 F

## 2025-08-26 DIAGNOSIS — M17.10 ARTHRITIS OF KNEE: Primary | ICD-10-CM

## 2025-08-26 DIAGNOSIS — M25.50 ARTHRALGIA, UNSPECIFIED JOINT: ICD-10-CM

## 2025-08-26 DIAGNOSIS — L40.9 PSORIASIS: ICD-10-CM

## 2025-08-26 DIAGNOSIS — R73.01 IMPAIRED FASTING GLUCOSE: ICD-10-CM

## 2025-08-26 DIAGNOSIS — Z13.220 SCREENING FOR LIPID DISORDERS: ICD-10-CM

## 2025-08-26 DIAGNOSIS — E55.9 VITAMIN D DEFICIENCY: ICD-10-CM

## 2025-08-26 RX ORDER — SENNOSIDES 8.6 MG
650 CAPSULE ORAL EVERY 8 HOURS PRN
COMMUNITY

## 2025-08-27 LAB
25(OH)D3+25(OH)D2 SERPL-MCNC: 99.8 NG/ML (ref 30–100)
ALBUMIN SERPL-MCNC: 4.1 G/DL (ref 3.5–5.2)
ALBUMIN/GLOB SERPL: 1.3 G/DL
ALP SERPL-CCNC: 65 U/L (ref 39–117)
ALT SERPL-CCNC: 15 U/L (ref 1–33)
AST SERPL-CCNC: 18 U/L (ref 1–32)
BILIRUB SERPL-MCNC: 0.4 MG/DL (ref 0–1.2)
BUN SERPL-MCNC: 23 MG/DL (ref 8–23)
BUN/CREAT SERPL: 21.9 (ref 7–25)
CALCIUM SERPL-MCNC: 9.6 MG/DL (ref 8.6–10.5)
CHLORIDE SERPL-SCNC: 102 MMOL/L (ref 98–107)
CHOLEST SERPL-MCNC: 200 MG/DL (ref 0–200)
CHOLEST/HDLC SERPL: 5.26 {RATIO}
CO2 SERPL-SCNC: 25.2 MMOL/L (ref 22–29)
CREAT SERPL-MCNC: 1.05 MG/DL (ref 0.57–1)
EGFRCR SERPLBLD CKD-EPI 2021: 57.3 ML/MIN/1.73
GLOBULIN SER CALC-MCNC: 3.1 GM/DL
GLUCOSE SERPL-MCNC: 84 MG/DL (ref 65–99)
HDLC SERPL-MCNC: 38 MG/DL (ref 40–60)
LDLC SERPL CALC-MCNC: 108 MG/DL (ref 0–100)
POTASSIUM SERPL-SCNC: 4.9 MMOL/L (ref 3.5–5.2)
PROT SERPL-MCNC: 7.2 G/DL (ref 6–8.5)
SODIUM SERPL-SCNC: 140 MMOL/L (ref 136–145)
TRIGL SERPL-MCNC: 317 MG/DL (ref 0–150)
VLDLC SERPL CALC-MCNC: 54 MG/DL (ref 5–40)

## (undated) DEVICE — NEEDLE, QUINCKE, 20GX3.5": Brand: MEDLINE

## (undated) DEVICE — GLV SURG BIOGEL LTX PF 6 1/2

## (undated) DEVICE — SOL IRR NACL 0.9PCT 3000ML

## (undated) DEVICE — SPECIMEN ADAPTOR: Brand: BEMIS

## (undated) DEVICE — STRAP STIRUP WO/ RNG

## (undated) DEVICE — SNAR POLYP SENSATION STDOVL 27 240 BX40

## (undated) DEVICE — CANN NASL CO2 TRULINK W/O2 A/

## (undated) DEVICE — TUBING SET, IRRIGATION, PC: Brand: N.A.

## (undated) DEVICE — KT ORCA ORCAPOD DISP STRL

## (undated) DEVICE — SEAL HYSTERSCOPE/OUTFLOW CHANNEL MYOSURE

## (undated) DEVICE — Device

## (undated) DEVICE — DEV TISS REMOV MYOSURE REACH

## (undated) DEVICE — GOWN ,SIRUS,NONREINFORCED 3XL: Brand: MEDLINE

## (undated) DEVICE — LOU D & C HYSTEROSCOPY: Brand: MEDLINE INDUSTRIES, INC.

## (undated) DEVICE — FLEX ADVANTAGE 1500CC: Brand: FLEX ADVANTAGE

## (undated) DEVICE — GOWN ISOL W/THUMB UNIV BLU BX/15

## (undated) DEVICE — DRAPE,UNDERBUTTOCKS,PCH,STERILE: Brand: MEDLINE